# Patient Record
Sex: MALE | Race: OTHER | HISPANIC OR LATINO | Employment: UNEMPLOYED | URBAN - METROPOLITAN AREA
[De-identification: names, ages, dates, MRNs, and addresses within clinical notes are randomized per-mention and may not be internally consistent; named-entity substitution may affect disease eponyms.]

---

## 2018-02-12 ENCOUNTER — HOSPITAL ENCOUNTER (EMERGENCY)
Facility: HOSPITAL | Age: 23
Discharge: HOME | End: 2018-02-12
Attending: EMERGENCY MEDICINE | Admitting: EMERGENCY MEDICINE
Payer: COMMERCIAL

## 2018-02-12 VITALS
HEART RATE: 100 BPM | SYSTOLIC BLOOD PRESSURE: 147 MMHG | RESPIRATION RATE: 18 BRPM | WEIGHT: 142.8 LBS | DIASTOLIC BLOOD PRESSURE: 84 MMHG | TEMPERATURE: 98.6 F | OXYGEN SATURATION: 99 %

## 2018-02-12 DIAGNOSIS — N34.2 URETHRITIS: Primary | ICD-10-CM

## 2018-02-12 LAB
BACTERIA UR QL AUTO: ABNORMAL /HPF
BILIRUB UR QL STRIP: ABNORMAL
CLARITY UR: ABNORMAL
COLOR UR: YELLOW
GLUCOSE UR STRIP-MCNC: NEGATIVE MG/DL
HGB UR QL STRIP.AUTO: ABNORMAL
KETONES UR STRIP-MCNC: NEGATIVE MG/DL
LEUKOCYTE ESTERASE UR QL STRIP: ABNORMAL
NITRITE UR QL STRIP: NEGATIVE
NON-SQ EPI CELLS URNS QL MICRO: ABNORMAL /HPF
PH UR STRIP.AUTO: 6 [PH] (ref 4.5–8)
PROT UR STRIP-MCNC: ABNORMAL MG/DL
RBC #/AREA URNS AUTO: ABNORMAL /HPF
SP GR UR STRIP.AUTO: 1.02 (ref 1–1.03)
UROBILINOGEN UR QL STRIP.AUTO: 1 E.U./DL
WBC #/AREA URNS AUTO: ABNORMAL /HPF

## 2018-02-12 PROCEDURE — 87491 CHLMYD TRACH DNA AMP PROBE: CPT | Performed by: EMERGENCY MEDICINE

## 2018-02-12 PROCEDURE — 99283 EMERGENCY DEPT VISIT LOW MDM: CPT

## 2018-02-12 PROCEDURE — 87591 N.GONORRHOEAE DNA AMP PROB: CPT | Performed by: EMERGENCY MEDICINE

## 2018-02-12 PROCEDURE — 87086 URINE CULTURE/COLONY COUNT: CPT

## 2018-02-12 PROCEDURE — 81001 URINALYSIS AUTO W/SCOPE: CPT

## 2018-02-12 PROCEDURE — 81002 URINALYSIS NONAUTO W/O SCOPE: CPT | Performed by: EMERGENCY MEDICINE

## 2018-02-12 PROCEDURE — 96372 THER/PROPH/DIAG INJ SC/IM: CPT

## 2018-02-12 RX ORDER — IBUPROFEN 400 MG/1
400 TABLET ORAL ONCE
Status: COMPLETED | OUTPATIENT
Start: 2018-02-12 | End: 2018-02-12

## 2018-02-12 RX ORDER — AZITHROMYCIN 250 MG/1
1000 TABLET, FILM COATED ORAL ONCE
Status: COMPLETED | OUTPATIENT
Start: 2018-02-12 | End: 2018-02-12

## 2018-02-12 RX ADMIN — IBUPROFEN 400 MG: 400 TABLET, FILM COATED ORAL at 18:47

## 2018-02-12 RX ADMIN — WATER 250 MG: 1 INJECTION INTRAMUSCULAR; INTRAVENOUS; SUBCUTANEOUS at 18:48

## 2018-02-12 RX ADMIN — AZITHROMYCIN 1000 MG: 250 TABLET, FILM COATED ORAL at 18:47

## 2018-02-12 NOTE — DISCHARGE INSTRUCTIONS
We are treating you for gonorrhea and chlamydia, sexually transmitted infections  Please return emergency department have any change in symptoms worsening symptoms or any other concerns  Also included in your discharge paperwork is the [address], please call this number to get established with primary care  Chlamydia   WHAT YOU NEED TO KNOW:   Chlamydia is a sexually transmitted infection (STI)  It is caused by a bacteria most often spread through vaginal, oral, or anal sex  You have an increased risk of chlamydia if you have another STI, such as gonorrhea  Your risk is also higher if you have more than 1 sex partner  DISCHARGE INSTRUCTIONS:   Return to the emergency department if:   · You have a fever  · You have nausea or you cannot stop vomiting  · You have severe abdominal pain  Contact your healthcare provider if:   · Your signs or symptoms last longer than 1 week or get worse during treatment  · Your signs or symptoms return after treatment  · You have pain during sex  · You have questions or concerns about your condition or care  Medicines:   · Antibiotics  kill the bacteria that causes chlamydia  Take them as directed  · Take your medicine as directed  Contact your healthcare provider if you think your medicine is not helping or if you have side effects  Tell him or her if you are allergic to any medicine  Keep a list of the medicines, vitamins, and herbs you take  Include the amounts, and when and why you take them  Bring the list or the pill bottles to follow-up visits  Carry your medicine list with you in case of an emergency  Follow up with your healthcare provider as directed: You may need to return regularly for tests  Write down your questions so you remember to ask them during your visits  Prevent the spread of chlamydia:   · Wash your hands often  Use soap and water  Wash your hands after you use the bathroom   This helps prevent the infection from spreading to other parts of your body, such as your eyes  · Use a latex condom during sex to prevent chlamydia and other STIs  Use a new condom each time you have sex  · Talk to your sex partners  Tell anyone you have had sex with in the last 3 months that you have chlamydia  They may also be infected and need treatment  Ask your sex partners to get tested before you have sex  · Do not have sex until you and your partner have taken all of your antibiotics  Ask your healthcare provider when it is safe to have sex  · Get regular screenings for STIs  Ask your healthcare provider how often to get tested for STIs  He may tell you to get tested after you have sex with a new partner  Manage your symptoms:   · Keep your genital area clean and dry  Take showers instead of baths, and use unscented soap  · Do not douche unless your healthcare provider says it is okay  Do not use feminine hygiene sprays or powders  Tell your healthcare provider if you are pregnant:  You can spread chlamydia to your baby while you are pregnant  Your baby could get an eye infection or pneumonia  Chlamydia may also cause your baby to be born too early  Early treatment may prevent your baby from getting chlamydia  © 2017 [employer]  Information is for End User's use only and may not be sold, redistributed or otherwise used for commercial purposes  All illustrations and images included in CareNotes® are the copyrighted property of [employer]  or Declan Casillas  The above information is an  only  It is not intended as medical advice for individual conditions or treatments  Talk to your doctor, nurse or pharmacist before following any medical regimen to see if it is safe and effective for you

## 2018-02-12 NOTE — ED ATTENDING ATTESTATION
Caity Bliss DO, saw and evaluated the patient  I have discussed the patient with the resident/non-physician practitioner and agree with the resident's/non-physician practitioner's findings, Plan of Care, and MDM as documented in the resident's/non-physician practitioner's note, except where noted  All available labs and Radiology studies were reviewed  At this point I agree with the current assessment done in the Emergency Department  I have conducted an independent evaluation of this patient a history and physical is as follows:      Critical Care Time  CritCare Time    Procedures   57-year-old male with no past medical history presents to the emergency department with multiple complaints  His main complaint is that of diffuse body aches and decreased appetite  He states he has had a 10 pound weight loss in 2 weeks  Patient did privately confide in the mail ED resident stating that he has been having dysuria and penile discharge  ED resident reports purulent penile discharge  On my exam patient appears well and is in no acute distress  Pupils are equal and reactive to light  Extraocular muscles are intact  Neck is supple  TMs are normal   Mucous membranes are moist  No cervical lymphadenopathy  Heart is regular without murmur  Lungs are clear  Abdomen is soft and nontender  No hernia  No inguinal lymphadenopathy  Genital exam as per ED resident  Neurologically patient has no focal motor deficits  Skin is warm and dry, normal color no rash

## 2018-02-13 LAB — BACTERIA UR CULT: NORMAL

## 2018-02-13 NOTE — ED PROVIDER NOTES
History  Chief Complaint   Patient presents with    Generalized Body Aches     Pt reports generalized body pain x 2 days, "and I have a lot of headache and I drink ibuprofen, but it's not doing nothing", subjective fever yesterday, denies NV, diarrhea x1 today  HPI   57-year-old male comes in for evaluation body aches  Patient states that for week he has had body aches everywhere  However patient denies fevers chills sweats nausea vomiting or diarrhea  Asking the patient's partner to leave the room, I had another interview with the patient who does endorse painful urination with discharge  Patient does have sex with men  Last sexual activity was over a month ago  Patient intermittently uses condoms  Patient engages in both penetrative of and receptive anal sex  Patient has never had an HIV test     None       History reviewed  No pertinent past medical history  History reviewed  No pertinent surgical history  History reviewed  No pertinent family history  I have reviewed and agree with the history as documented  Social History   Substance Use Topics    Smoking status: Never Smoker    Smokeless tobacco: Never Used    Alcohol use No        Review of Systems   Constitutional: Negative  HENT: Negative  Eyes: Negative  Respiratory: Negative  Cardiovascular: Negative  Gastrointestinal: Negative  Endocrine: Negative  Genitourinary: Positive for discharge, dysuria and penile pain  Musculoskeletal: Negative  Skin: Negative  Allergic/Immunologic: Negative  Neurological: Negative  Hematological: Negative  Psychiatric/Behavioral: Negative          Physical Exam  ED Triage Vitals [02/12/18 1708]   Temperature Pulse Respirations Blood Pressure SpO2   98 6 °F (37 °C) (!) 115 18 155/87 100 %      Temp Source Heart Rate Source Patient Position - Orthostatic VS BP Location FiO2 (%)   Oral Monitor Sitting Right arm --      Pain Score       Worst Possible Pain Orthostatic Vital Signs  Vitals:    02/12/18 1708 02/12/18 1854   BP: 155/87 147/84   Pulse: (!) 115 100   Patient Position - Orthostatic VS: Sitting        Physical Exam   Constitutional: He is oriented to person, place, and time  He appears well-developed and well-nourished  No distress  HENT:   Head: Normocephalic and atraumatic  Right Ear: External ear normal    Left Ear: External ear normal    Mouth/Throat: Oropharynx is clear and moist    Eyes: Conjunctivae and EOM are normal  Pupils are equal, round, and reactive to light  Right eye exhibits no discharge  Left eye exhibits no discharge  No scleral icterus  Neck: Normal range of motion  Neck supple  No tracheal deviation present  No thyromegaly present  Cardiovascular: Normal rate, regular rhythm and intact distal pulses  Exam reveals no gallop and no friction rub  No murmur heard  Pulmonary/Chest: Effort normal and breath sounds normal  No stridor  No respiratory distress  He has no wheezes  He has no rales  Abdominal: Soft  Bowel sounds are normal  He exhibits no distension  There is no tenderness  There is no rebound and no guarding  Genitourinary:   Genitourinary Comments: Uncircumcised male with mucoid purulence coming from the urethral meatus  Musculoskeletal: Normal range of motion  He exhibits no edema or deformity  Neurological: He is alert and oriented to person, place, and time  No cranial nerve deficit  Skin: Skin is warm and dry  No rash noted  He is not diaphoretic  No erythema  Psychiatric: He has a normal mood and affect  His behavior is normal  Thought content normal    Nursing note and vitals reviewed        ED Medications  Medications   cefTRIAXone (ROCEPHIN) 250 mg in sterile water IM only syringe (250 mg Intramuscular Given 2/12/18 1848)   azithromycin (ZITHROMAX) tablet 1,000 mg (1,000 mg Oral Given 2/12/18 1847)   ibuprofen (MOTRIN) tablet 400 mg (400 mg Oral Given 2/12/18 1847)       Diagnostic Studies  Results Reviewed     Procedure Component Value Units Date/Time    Urine Microscopic [[id number]]  (Abnormal) Collected:  02/12/18 1856    Lab Status:  Final result Specimen:  Urine from Urine, Clean Catch Updated:  02/12/18 1958     RBC, UA 2-4 (A) /hpf      WBC, UA Innumerable (A) /hpf      Epithelial Cells Occasional /hpf      Bacteria, UA Occasional /hpf     Urine culture [[id number]] Collected:  02/12/18 1856    Lab Status: In process Specimen:  Urine from Urine, Clean Catch Updated:  02/12/18 1958    Chlamydia/GC amplified DNA by PCR [[id number]] Collected:  02/12/18 1900    Lab Status: In process Specimen:  Urine from Urine, Other Updated:  02/12/18 1908    POCT urinalysis dipstick [[id number]]  (Abnormal) Resulted:  02/12/18 1900    Lab Status:  Final result Specimen:  Urine Updated:  02/12/18 1900    ED Urine Macroscopic [[id number]]  (Abnormal) Collected:  02/12/18 1856    Lab Status:  Final result Specimen:  Urine Updated:  02/12/18 1856     Color, UA Yellow     Clarity, UA Cloudy     pH, UA 6 0     Leukocytes, UA Large (A)     Nitrite, UA Negative     Protein,  (2+) (A) mg/dl      Glucose, UA Negative mg/dl      Ketones, UA Negative mg/dl      Urobilinogen, UA 1 0 E U /dl      Bilirubin, UA Interference- unable to analyze (A)     Blood, UA Small (A)     Specific [place], UA 1 025    Narrative:       CLINITEK RESULT                 No orders to display         Procedures  Procedures      Phone Consults  ED Phone Contact    ED Course  ED Course                                MDM  Number of Diagnoses or Management Options  Urethritis:   Diagnosis management comments: 42-year-old male presents for evaluation of dysuria and urethral discharge  Will treat for GC and Chlamydia  Discussed patient following up with primary care for HIV test   Will provide patient with Milagros[facility] Chet Coburn link to follow up with primary care      CritCare Time    Disposition  Final diagnoses:   Urethritis     Time reflects when diagnosis was documented in both MDM as applicable and the Disposition within this note     Time User Action Codes Description Comment    2/12/2018  6:29 PM Silvia Quiros Add [N34 2] Urethritis       ED Disposition     ED Disposition Condition Comment    Discharge  Eveline Lowe discharge to home/self care  Condition at discharge: Stable        Follow-up Information     Follow up With Specialties Details Why Contact Info Additional [address] [facility] Emergency Department Emergency Medicine Go to As needed, If symptoms worsen [address] [facility]  [phone] [place] ED, [address] [facility] [address]  Call To get established with primary care  [phone]           There are no discharge medications for this patient  No discharge procedures on file  ED Provider  Attending physically available and evaluated Eveline Lowe I managed the patient along with the ED Attending      Electronically Signed by         Galileo Oconnor MD  02/13/18 8571

## 2018-02-17 NOTE — PROGRESS NOTES
Informed patient of +gonorrhea  Patient was treated in the ER  Instructed follow up with pcp  Patient agreeable

## 2018-02-22 LAB
CHLAMYDIA DNA CVX QL NAA+PROBE: ABNORMAL
N GONORRHOEA DNA GENITAL QL NAA+PROBE: ABNORMAL

## 2019-09-24 ENCOUNTER — HOSPITAL ENCOUNTER (EMERGENCY)
Facility: HOSPITAL | Age: 24
Discharge: HOME | End: 2019-09-24
Attending: EMERGENCY MEDICINE | Admitting: EMERGENCY MEDICINE

## 2019-09-24 VITALS
DIASTOLIC BLOOD PRESSURE: 80 MMHG | RESPIRATION RATE: 16 BRPM | SYSTOLIC BLOOD PRESSURE: 146 MMHG | HEART RATE: 83 BPM | WEIGHT: 154.76 LBS | TEMPERATURE: 98.2 F | OXYGEN SATURATION: 99 %

## 2019-09-24 DIAGNOSIS — M54.50 ACUTE LOW BACK PAIN: Primary | ICD-10-CM

## 2019-09-24 PROCEDURE — 99283 EMERGENCY DEPT VISIT LOW MDM: CPT | Performed by: PHYSICIAN ASSISTANT

## 2019-09-24 PROCEDURE — 99283 EMERGENCY DEPT VISIT LOW MDM: CPT

## 2019-09-24 RX ORDER — LIDOCAINE 50 MG/G
1 PATCH TOPICAL ONCE
Status: DISCONTINUED | OUTPATIENT
Start: 2019-09-24 | End: 2019-09-24 | Stop reason: HOSPADM

## 2019-09-24 RX ORDER — METHOCARBAMOL 500 MG/1
500 TABLET, FILM COATED ORAL 2 TIMES DAILY PRN
Qty: 20 TABLET | Refills: 0 | Status: SHIPPED | OUTPATIENT
Start: 2019-09-24 | End: 2020-07-21

## 2019-09-24 RX ORDER — NAPROXEN 500 MG/1
500 TABLET ORAL 2 TIMES DAILY PRN
Qty: 30 TABLET | Refills: 0 | Status: SHIPPED | OUTPATIENT
Start: 2019-09-24 | End: 2020-07-21

## 2019-09-24 RX ORDER — NAPROXEN 250 MG/1
500 TABLET ORAL ONCE
Status: COMPLETED | OUTPATIENT
Start: 2019-09-24 | End: 2019-09-24

## 2019-09-24 RX ORDER — METHOCARBAMOL 500 MG/1
500 TABLET, FILM COATED ORAL ONCE
Status: COMPLETED | OUTPATIENT
Start: 2019-09-24 | End: 2019-09-24

## 2019-09-24 RX ADMIN — LIDOCAINE 1 PATCH: 50 PATCH TOPICAL at 17:08

## 2019-09-24 RX ADMIN — METHOCARBAMOL TABLETS 500 MG: 500 TABLET, COATED ORAL at 17:08

## 2019-09-24 RX ADMIN — NAPROXEN 500 MG: 250 TABLET ORAL at 17:08

## 2019-09-24 NOTE — ED PROVIDER NOTES
History  Chief Complaint   Patient presents with    Back Pain     Patient reports lower back pain  Describing pain as spasms  started two days ago  no injury  denies loss of bowel or bladder function  Meryl Altamirano is a 21 yo M presenting with right lower back pain ongoing x3 days  Patient also describes sensation of spasm or musculature low back  Patient states he was lifting a heavy box prior to onset of pain  Denies numbness or tingling in the legs  Patient ambulatory initially and at this time  No loss of control of bowel or bladder function  No saddle anesthesia  No fevers/chills/IVDA  No medications prior to arrival for pain  History provided by:  Patient   used: No    Back Pain   Location:  Lumbar spine  Quality:  Aching and cramping  Radiates to:  Does not radiate  Pain severity:  No pain  Onset quality:  Sudden  Duration:  3 days  Timing:  Constant  Progression:  Waxing and waning  Chronicity:  New  Context: lifting heavy objects    Context: not falling    Worsened by:  Bending, twisting and touching  Ineffective treatments:  None tried  Associated symptoms: no abdominal pain, no bladder incontinence, no bowel incontinence, no chest pain, no fever, no headaches, no numbness and no weakness        None       History reviewed  No pertinent past medical history  History reviewed  No pertinent surgical history  History reviewed  No pertinent family history  I have reviewed and agree with the history as documented  Social History     Tobacco Use    Smoking status: Never Smoker    Smokeless tobacco: Never Used   Substance Use Topics    Alcohol use: No    Drug use: No        Review of Systems   Constitutional: Negative for activity change, chills and fever  HENT: Negative for congestion, rhinorrhea and sore throat  Eyes: Negative for photophobia and visual disturbance  Respiratory: Negative for cough, chest tightness, shortness of breath and wheezing  Cardiovascular: Negative for chest pain and palpitations  Gastrointestinal: Negative for abdominal pain, bowel incontinence, constipation, diarrhea, nausea and vomiting  Genitourinary: Negative for bladder incontinence, difficulty urinating, enuresis, flank pain, frequency, hematuria and urgency  Musculoskeletal: Positive for back pain  Negative for gait problem, neck pain and neck stiffness  Skin: Negative for rash and wound  Neurological: Negative for dizziness, tremors, speech difficulty, weakness, light-headedness, numbness and headaches  Physical Exam  Physical Exam   Constitutional: He is oriented to person, place, and time  He appears well-developed and well-nourished  No distress  HENT:   Head: Normocephalic and atraumatic  Right Ear: External ear normal    Left Ear: External ear normal    Nose: Nose normal    Mouth/Throat: Oropharynx is clear and moist  No oropharyngeal exudate  Eyes: Pupils are equal, round, and reactive to light  Conjunctivae and EOM are normal    Neck: Normal range of motion  Neck supple  Cardiovascular: Normal rate, regular rhythm, normal heart sounds and intact distal pulses  Exam reveals no gallop and no friction rub  No murmur heard  Pulmonary/Chest: Effort normal and breath sounds normal  No respiratory distress  He has no wheezes  He has no rales  Abdominal: Soft  He exhibits no distension and no mass  There is no tenderness  There is no guarding  Musculoskeletal: He exhibits tenderness  He exhibits no edema or deformity  Tenderness to palpation right lumbar paraspinal musculature  Limited range of motion lumbar spine in flexion and extension due to pain  Negative straight leg raise on the right side   Lymphadenopathy:     He has no cervical adenopathy  Neurological: He is alert and oriented to person, place, and time  He displays normal reflexes  No cranial nerve deficit or sensory deficit  He exhibits normal muscle tone   Coordination normal  Skin: Skin is warm and dry  Capillary refill takes less than 2 seconds  No rash noted  He is not diaphoretic  No erythema  No pallor  Psychiatric: He has a normal mood and affect  His behavior is normal  Judgment and thought content normal    Nursing note and vitals reviewed  Vital Signs  ED Triage Vitals [09/24/19 1644]   Temperature Pulse Respirations Blood Pressure SpO2   98 2 °F (36 8 °C) 83 16 146/80 99 %      Temp Source Heart Rate Source Patient Position - Orthostatic VS BP Location FiO2 (%)   Oral Monitor Sitting Right arm --      Pain Score       Worst Possible Pain           Vitals:    09/24/19 1644   BP: 146/80   Pulse: 83   Patient Position - Orthostatic VS: Sitting         Visual Acuity      ED Medications  Medications   lidocaine (LIDODERM) 5 % patch 1 patch (1 patch Topical Medication Applied 9/24/19 1708)   naproxen (NAPROSYN) tablet 500 mg (500 mg Oral Given 9/24/19 1708)   methocarbamol (ROBAXIN) tablet 500 mg (500 mg Oral Given 9/24/19 1708)       Diagnostic Studies  Results Reviewed     None                 No orders to display              Procedures  Procedures       ED Course  ED Course as of Sep 24 1758   Tue Sep 24, 2019   1756 Patient reports significant improvement in low back pain following ED therapy  MDM  Number of Diagnoses or Management Options  Acute low back pain:   Diagnosis management comments: Right lower back pain, muscular tenderness to palpation with spasm noted  Significant improvement pain after ED therapy  Advised follow-up with orthopedics as needed      Patient Progress  Patient progress: improved      Disposition  Final diagnoses:   Acute low back pain     Time reflects when diagnosis was documented in both MDM as applicable and the Disposition within this note     Time User Action Codes Description Comment    9/24/2019  5:56 PM Sivan Alegre Add [M54 5] Acute low back pain       ED Disposition     ED Disposition Condition Date/Time Comment    Discharge Stable [place] Sep 24, 2019  5:56 PM Dangelo Corbett discharge to home/self care  Follow-up Information     Follow up With Specialties Details Why Contact Info Additional Information    [address] [facility]  As needed [address]  [place] [address] [facility] [address]  [address] [facility], [address],  [address] [facility] [place], [address]          Patient's Medications   Discharge Prescriptions    METHOCARBAMOL (ROBAXIN) 500 MG TABLET    Take 1 tablet (500 mg total) by mouth 2 (two) times a day as needed for muscle spasms       Start Date: 9/24/2019 End Date: --       Order Dose: 500 mg       Quantity: 20 tablet    Refills: 0    NAPROXEN (NAPROSYN) 500 MG TABLET    Take 1 tablet (500 mg total) by mouth 2 (two) times a day as needed for mild pain or moderate pain       Start Date: 9/24/2019 End Date: --       Order Dose: 500 mg       Quantity: 30 tablet    Refills: 0     No discharge procedures on file      ED Provider  Electronically Signed by           Thomas Avalos PA-C  09/24/19 4440

## 2020-07-21 ENCOUNTER — HOSPITAL ENCOUNTER (EMERGENCY)
Facility: HOSPITAL | Age: 25
Discharge: LEFT BEFORE DISCHARGE | End: 2020-07-21
Attending: EMERGENCY MEDICINE | Admitting: EMERGENCY MEDICINE
Payer: OTHER GOVERNMENT

## 2020-07-21 VITALS
WEIGHT: 152.12 LBS | TEMPERATURE: 98 F | HEART RATE: 77 BPM | DIASTOLIC BLOOD PRESSURE: 85 MMHG | SYSTOLIC BLOOD PRESSURE: 151 MMHG | RESPIRATION RATE: 16 BRPM | OXYGEN SATURATION: 99 %

## 2020-07-21 DIAGNOSIS — R63.0 LOSS OF APPETITE: Primary | ICD-10-CM

## 2020-07-21 LAB — SARS-COV-2 RNA RESP QL NAA+PROBE: NEGATIVE

## 2020-07-21 PROCEDURE — 99284 EMERGENCY DEPT VISIT MOD MDM: CPT

## 2020-07-21 PROCEDURE — 99282 EMERGENCY DEPT VISIT SF MDM: CPT | Performed by: EMERGENCY MEDICINE

## 2020-07-21 PROCEDURE — 87635 SARS-COV-2 COVID-19 AMP PRB: CPT | Performed by: PHYSICIAN ASSISTANT

## 2020-07-21 NOTE — ED PROVIDER NOTES
History  Chief Complaint   Patient presents with    Loss of Appetite     pt c/o loss of taste and loss of appetite that started this morning; pt denies fevers, cough  pt denies cp/sob/n/v/d     Kate Black is a 31 yo M presenting with loss of taste and smell, and decreased appetite since this morning  Reports multiple known sick contacts at work with confirmed COVID-23  Reports he is otherwise asymptomatic  Denies cough, fevers, chills, myalgias  Denies recent travel  History provided by:  Patient   used: No    Medical Problem   Quality:  Loss of taste/smell  Onset quality:  Sudden  Duration:  1 day  Timing:  Constant  Chronicity:  New  Associated symptoms: no abdominal pain, no chest pain, no congestion, no cough, no ear pain, no fever, no nausea, no rash, no rhinorrhea, no shortness of breath, no sore throat, no vomiting and no wheezing        None       History reviewed  No pertinent past medical history  Past Surgical History:   Procedure Laterality Date    NO PAST SURGERIES         History reviewed  No pertinent family history  I have reviewed and agree with the history as documented  E-Cigarette/Vaping    E-Cigarette Use Never User      E-Cigarette/Vaping Substances     Social History     Tobacco Use    Smoking status: Current Every Day Smoker     Packs/day: 0 25    Smokeless tobacco: Never Used   Substance Use Topics    Alcohol use: No    Drug use: No       Review of Systems   Constitutional: Negative for chills and fever  HENT: Negative for congestion, dental problem, ear pain, rhinorrhea, sore throat, trouble swallowing and voice change  Eyes: Negative for pain and visual disturbance  Respiratory: Negative for cough, shortness of breath and wheezing  Cardiovascular: Negative for chest pain and palpitations  Gastrointestinal: Negative for abdominal pain, nausea and vomiting  Genitourinary: Negative for dysuria, frequency and urgency     Musculoskeletal: Negative for back pain, neck pain and neck stiffness  Skin: Negative for rash and wound  Neurological: Negative for dizziness, weakness, light-headedness and numbness  Physical Exam  Physical Exam   Constitutional: He is oriented to person, place, and time  He appears well-developed and well-nourished  No distress  HENT:   Head: Normocephalic and atraumatic  Right Ear: External ear normal    Left Ear: External ear normal    Mouth/Throat: Oropharynx is clear and moist    Eyes: Pupils are equal, round, and reactive to light  Conjunctivae and EOM are normal    Neck: Normal range of motion  Neck supple  Cardiovascular: Normal rate, regular rhythm, normal heart sounds and intact distal pulses  Exam reveals no gallop and no friction rub  No murmur heard  Pulmonary/Chest: Effort normal and breath sounds normal  No respiratory distress  He has no wheezes  Abdominal: Soft  He exhibits no distension  There is no tenderness  Lymphadenopathy:     He has no cervical adenopathy  Neurological: He is alert and oriented to person, place, and time  He exhibits normal muscle tone  Coordination normal    Skin: Skin is warm and dry  Capillary refill takes less than 2 seconds  No rash noted  No erythema  Psychiatric: He has a normal mood and affect   His behavior is normal  Judgment and thought content normal        Vital Signs  ED Triage Vitals [07/21/20 0919]   Temperature Pulse Respirations Blood Pressure SpO2   98 °F (36 7 °C) 77 16 151/85 99 %      Temp Source Heart Rate Source Patient Position - Orthostatic VS BP Location FiO2 (%)   Temporal Monitor Sitting Right arm --      Pain Score       --           Vitals:    07/21/20 0919   BP: 151/85   Pulse: 77   Patient Position - Orthostatic VS: Sitting         Visual Acuity      ED Medications  Medications - No data to display    Diagnostic Studies  Results Reviewed     Procedure Component Value Units Date/Time    Novel Coronavirus (Covid-19),PCR [facility] [[id number]]  (Normal) Collected:  07/21/20 1006    Lab Status:  Final result Specimen:  Nares from Nose Updated:  07/21/20 1105     SARS-CoV-2 Negative    Narrative:       Reza Skelton in the ER                 No orders to display              Procedures  Procedures         ED Course                                             MDM  Number of Diagnoses or Management Options  Loss of appetite:   Diagnosis management comments: Loss of taste/smell, appetite since this morning  Pt otherwise asymptomatic  COVID-19 testing obtained as pt has close COVID-19 positive contacts  At home quarantine instructions/return information provided  Strict return indications discussed  Disposition  Final diagnoses:   Loss of appetite     Time reflects when diagnosis was documented in both MDM as applicable and the Disposition within this note     Time User Action Codes Description Comment    7/21/2020 10:21 AM Tony Burroughs Add [R63 0] Loss of appetite       ED Disposition     ED Disposition Condition Date/Time Comment    Aria Anneamparo Jul 21, 2020 10:05 AM       Follow-up Information     Follow up With Specialties Details Why Contact Info Additional [address] [facility] Emergency Department Emergency Medicine  If symptoms worsen [facility] [address] [phone] [place] ED, [address]  , [facility], [address] [facility], [address]          There are no discharge medications for this patient  No discharge procedures on file      PDMP Review     None          ED Provider  Electronically Signed by           Chayo Rodney PA-C  07/21/20 7943

## 2020-07-21 NOTE — ED NOTES
Pt swabbed by provider and left department without waiting for discharge paperwork        Suha Jimenez RN  07/21/20 9480

## 2020-12-12 ENCOUNTER — OCCMED (OUTPATIENT)
Dept: URGENT CARE | Facility: MEDICAL CENTER | Age: 25
End: 2020-12-12
Payer: OTHER MISCELLANEOUS

## 2020-12-12 DIAGNOSIS — Z53.21 PROC/TRTMT NOT CRD OUT D/T PT LV BEF SEEN BY HLTH CARE PROV: Primary | ICD-10-CM

## 2020-12-12 PROCEDURE — 99283 EMERGENCY DEPT VISIT LOW MDM: CPT | Performed by: FAMILY MEDICINE

## 2020-12-12 PROCEDURE — G0382 LEV 3 HOSP TYPE B ED VISIT: HCPCS | Performed by: FAMILY MEDICINE

## 2021-05-13 ENCOUNTER — HOSPITAL ENCOUNTER (EMERGENCY)
Facility: HOSPITAL | Age: 26
Discharge: HOME | End: 2021-05-13
Attending: EMERGENCY MEDICINE | Admitting: EMERGENCY MEDICINE

## 2021-05-13 VITALS
HEART RATE: 81 BPM | OXYGEN SATURATION: 100 % | SYSTOLIC BLOOD PRESSURE: 120 MMHG | RESPIRATION RATE: 18 BRPM | DIASTOLIC BLOOD PRESSURE: 78 MMHG | TEMPERATURE: 97.6 F

## 2021-05-13 DIAGNOSIS — Z20.2 STD EXPOSURE: Primary | ICD-10-CM

## 2021-05-13 PROCEDURE — 99283 EMERGENCY DEPT VISIT LOW MDM: CPT

## 2021-05-13 PROCEDURE — 36415 COLL VENOUS BLD VENIPUNCTURE: CPT | Performed by: PHYSICIAN ASSISTANT

## 2021-05-13 PROCEDURE — 99282 EMERGENCY DEPT VISIT SF MDM: CPT | Performed by: PHYSICIAN ASSISTANT

## 2021-05-13 PROCEDURE — 87389 HIV-1 AG W/HIV-1&-2 AB AG IA: CPT | Performed by: PHYSICIAN ASSISTANT

## 2021-05-13 NOTE — ED NOTES
Pt states that he has to leave and unable to provide urine sample  States main concern was the HIV test and needs to go         Liane Swanson RN  05/13/21 1959

## 2021-05-13 NOTE — ED PROVIDER NOTES
History  Chief Complaint   Patient presents with    Exposure to STD     Pt reports his partner of a few months just informed him that he has HIV and patient would like to be tested  Denies any symptoms  24-year-old male with no significant past medical history presents emergency department for evaluation exposure to STD  Patient reports his partner several months ago when he had unprotected intercourse approximately twice just informed him that the partner tested positive for HIV  Patient denies any fevers, body aches, abdominal pain, nausea, vomiting, diarrhea, penile pain, penile discharge, testicular pain or swelling  History provided by:  Medical records and patient   used: No        None       History reviewed  No pertinent past medical history  Past Surgical History:   Procedure Laterality Date    NO PAST SURGERIES         History reviewed  No pertinent family history  I have reviewed and agree with the history as documented  E-Cigarette/Vaping    E-Cigarette Use Never User      E-Cigarette/Vaping Substances     Social History     Tobacco Use    Smoking status: Current Every Day Smoker     Packs/day: 0 25    Smokeless tobacco: Never Used   Substance Use Topics    Alcohol use: No    Drug use: No       Review of Systems   Constitutional: Negative for chills, fever and unexpected weight change  Genitourinary: Negative for decreased urine volume, discharge, flank pain, genital sores, hematuria, penile pain, penile swelling and testicular pain  Skin: Negative for rash and wound  All other systems reviewed and are negative  Physical Exam  Physical Exam  Vitals signs and nursing note reviewed  Constitutional:       General: He is not in acute distress  Appearance: He is well-developed  He is not ill-appearing or toxic-appearing  HENT:      Head: Normocephalic and atraumatic        Right Ear: Hearing and external ear normal       Left Ear: Hearing and external ear normal    Eyes:      Conjunctiva/sclera: Conjunctivae normal    Neck:      Vascular: No JVD  Trachea: No tracheal deviation  Cardiovascular:      Rate and Rhythm: Normal rate and regular rhythm  Heart sounds: Normal heart sounds, S1 normal and S2 normal  No murmur  Pulmonary:      Effort: Pulmonary effort is normal       Breath sounds: Normal breath sounds  No decreased breath sounds, wheezing, rhonchi or rales  Genitourinary:     Comments: Deferred  Musculoskeletal:      Comments: Moves all four limbs without difficulty, crepitus, swelling, or deformity  Skin:     General: Skin is warm and dry  Findings: No rash  Neurological:      Mental Status: He is alert and oriented to person, place, and time  GCS: GCS eye subscore is 4  GCS verbal subscore is 5  GCS motor subscore is 6  Psychiatric:         Mood and Affect: Mood normal          Speech: Speech normal          Vital Signs  ED Triage Vitals [05/13/21 0728]   Temperature Pulse Respirations Blood Pressure SpO2   97 6 °F (36 4 °C) 81 18 120/78 100 %      Temp Source Heart Rate Source Patient Position - Orthostatic VS BP Location FiO2 (%)   Oral Monitor Sitting Right arm --      Pain Score       No Pain           Vitals:    05/13/21 0728   BP: 120/78   Pulse: 81   Patient Position - Orthostatic VS: Sitting         Visual Acuity      ED Medications  Medications - No data to display    Diagnostic Studies  Results Reviewed     Procedure Component Value Units Date/Time    HIV 1/2 [id number] Collected: 05/13/21 5808    Lab Status:  In process Specimen: Blood from Arm, Right Updated: 05/13/21 0835    Chlamydia/GC amplified DNA by PCR [[id number]]     Lab Status: No result                  No orders to display              Procedures  Procedures         ED Course                             SBIRT 20yo+      Most Recent Value   SBIRT (22 yo +)   In order to provide better care to our patients, we are screening all of our patients for alcohol and drug use  Would it be okay to ask you these screening questions? Yes Filed at: 05/13/2021 0817   Initial Alcohol Screen: US AUDIT-C    1  How often do you have a drink containing alcohol?  0 Filed at: 05/13/2021 0817   2  How many drinks containing alcohol do you have on a typical day you are drinking? 0 Filed at: 05/13/2021 0817   3a  Male UNDER 65: How often do you have five or more drinks on one occasion? 0 Filed at: 05/13/2021 0817   Audit-C Score  0 Filed at: 05/13/2021 9658   RONNIE: How many times in the past year have you    Used an illegal drug or used a prescription medication for non-medical reasons? Never Filed at: 05/13/2021 0817                    MDM  Number of Diagnoses or Management Options  STD exposure:   Diagnosis management comments: 57-year-old male presents for concern for STD exposure  Patient's partner recently informed him of HIV status  Will send for HIV testing, gonorrhea/chlamydia  Counseled patient that should the HIV positive, he will need to establish care with an HIV clinic and a infectious disease specialist    & [facility] as well as Infectious Disease contact information works prior to the patient  The management plan was discussed in detail with the patient at bedside and all questions were answered  The prior to discharge, we provided both verbal and written instructions  We discussed with the patient the signs and symptoms for which to return to the emergency department  All questions were answered and patient was comfortable with the plan of care and discharged to home  Instructed the patient to follow up with the primary care provider and/or special as provided and their written instructions  The patient verbalized understanding of our discussion and plan of care, and agrees to return to the Emergency Department for concerns and progression of illness           Amount and/or Complexity of Data Reviewed  Clinical lab tests: ordered        Disposition  Final diagnoses:   STD exposure     Time reflects when diagnosis was documented in both MDM as applicable and the Disposition within this note     Time User Action Codes Description Comment    5/13/2021  8:21 AM [address] N[address] Marino [Z20 2] STD exposure       ED Disposition     ED Disposition Condition Date/Time Comment    Discharge Stable Thu May 13, 2021  8:21 AM [place] Slight discharge to home/self care  Follow-up Information     Follow up With Specialties Details Why Contact Info Additional Information    [facility]   follow up if testing is positive  [address] [facility]  [address] [address] [facility], [address] [place] [address] [facility]  follow up if testing is positive  [address] [facility] [address]  [address] [place] [address] [address], [address], [phone]          There are no discharge medications for this patient  No discharge procedures on file      PDMP Review     None          ED Provider  Electronically Signed by           Juan Jose Choe PA-C  05/13/21 8191

## 2021-05-14 LAB — HIV 1+2 AB+HIV1 P24 AG SERPL QL IA: NORMAL

## 2024-10-10 ENCOUNTER — HOSPITAL ENCOUNTER (INPATIENT)
Facility: HOSPITAL | Age: 29
LOS: 2 days | Discharge: HOME | DRG: 047 | End: 2024-10-13
Attending: EMERGENCY MEDICINE | Admitting: INTERNAL MEDICINE
Payer: COMMERCIAL

## 2024-10-10 ENCOUNTER — APPOINTMENT (EMERGENCY)
Dept: CT IMAGING | Facility: HOSPITAL | Age: 29
DRG: 047 | End: 2024-10-10
Payer: COMMERCIAL

## 2024-10-10 DIAGNOSIS — R29.810 FACIAL DROOP: Primary | ICD-10-CM

## 2024-10-10 DIAGNOSIS — G45.9 TIA (TRANSIENT ISCHEMIC ATTACK): ICD-10-CM

## 2024-10-10 DIAGNOSIS — R20.0 LEFT FACIAL NUMBNESS: ICD-10-CM

## 2024-10-10 PROBLEM — R29.90 STROKE-LIKE SYMPTOMS: Status: ACTIVE | Noted: 2024-10-10

## 2024-10-10 PROBLEM — E87.6 HYPOKALEMIA: Status: ACTIVE | Noted: 2024-10-10

## 2024-10-10 LAB
ALBUMIN SERPL BCG-MCNC: 4.9 G/DL (ref 3.5–5)
ALP SERPL-CCNC: 71 U/L (ref 34–104)
ALT SERPL W P-5'-P-CCNC: 41 U/L (ref 7–52)
ANION GAP SERPL CALCULATED.3IONS-SCNC: 6 MMOL/L (ref 4–13)
APTT PPP: 27 SECONDS (ref 23–34)
AST SERPL W P-5'-P-CCNC: 25 U/L (ref 13–39)
BASOPHILS # BLD AUTO: 0.05 THOUSANDS/ΜL (ref 0–0.1)
BASOPHILS NFR BLD AUTO: 1 % (ref 0–1)
BILIRUB SERPL-MCNC: 0.89 MG/DL (ref 0.2–1)
BUN SERPL-MCNC: 12 MG/DL (ref 5–25)
CALCIUM SERPL-MCNC: 9.7 MG/DL (ref 8.4–10.2)
CARDIAC TROPONIN I PNL SERPL HS: 3 NG/L
CHLORIDE SERPL-SCNC: 105 MMOL/L (ref 96–108)
CO2 SERPL-SCNC: 29 MMOL/L (ref 21–32)
CREAT SERPL-MCNC: 0.98 MG/DL (ref 0.6–1.3)
EOSINOPHIL # BLD AUTO: 0.22 THOUSAND/ΜL (ref 0–0.61)
EOSINOPHIL NFR BLD AUTO: 3 % (ref 0–6)
ERYTHROCYTE [DISTWIDTH] IN BLOOD BY AUTOMATED COUNT: 12.1 % (ref 11.6–15.1)
GFR SERPL CREATININE-BSD FRML MDRD: 103 ML/MIN/1.73SQ M
GLUCOSE SERPL-MCNC: 61 MG/DL (ref 65–140)
GLUCOSE SERPL-MCNC: 99 MG/DL (ref 65–140)
HCT VFR BLD AUTO: 45.7 % (ref 36.5–49.3)
HGB BLD-MCNC: 16 G/DL (ref 12–17)
IMM GRANULOCYTES # BLD AUTO: 0.02 THOUSAND/UL (ref 0–0.2)
IMM GRANULOCYTES NFR BLD AUTO: 0 % (ref 0–2)
INR PPP: 1.08 (ref 0.85–1.19)
LYMPHOCYTES # BLD AUTO: 2.15 THOUSANDS/ΜL (ref 0.6–4.47)
LYMPHOCYTES NFR BLD AUTO: 32 % (ref 14–44)
MCH RBC QN AUTO: 30.7 PG (ref 26.8–34.3)
MCHC RBC AUTO-ENTMCNC: 35 G/DL (ref 31.4–37.4)
MCV RBC AUTO: 88 FL (ref 82–98)
MONOCYTES # BLD AUTO: 0.71 THOUSAND/ΜL (ref 0.17–1.22)
MONOCYTES NFR BLD AUTO: 11 % (ref 4–12)
NEUTROPHILS # BLD AUTO: 3.57 THOUSANDS/ΜL (ref 1.85–7.62)
NEUTS SEG NFR BLD AUTO: 53 % (ref 43–75)
NRBC BLD AUTO-RTO: 0 /100 WBCS
PLATELET # BLD AUTO: 205 THOUSANDS/UL (ref 149–390)
PMV BLD AUTO: 10 FL (ref 8.9–12.7)
POTASSIUM SERPL-SCNC: 3.2 MMOL/L (ref 3.5–5.3)
PROT SERPL-MCNC: 7.7 G/DL (ref 6.4–8.4)
PROTHROMBIN TIME: 14.2 SECONDS (ref 12.3–15)
RBC # BLD AUTO: 5.21 MILLION/UL (ref 3.88–5.62)
SODIUM SERPL-SCNC: 140 MMOL/L (ref 135–147)
WBC # BLD AUTO: 6.72 THOUSAND/UL (ref 4.31–10.16)

## 2024-10-10 PROCEDURE — NC001 PR NO CHARGE: Performed by: EMERGENCY MEDICINE

## 2024-10-10 PROCEDURE — 36415 COLL VENOUS BLD VENIPUNCTURE: CPT

## 2024-10-10 PROCEDURE — 85610 PROTHROMBIN TIME: CPT

## 2024-10-10 PROCEDURE — 82948 REAGENT STRIP/BLOOD GLUCOSE: CPT

## 2024-10-10 PROCEDURE — 70498 CT ANGIOGRAPHY NECK: CPT

## 2024-10-10 PROCEDURE — 70496 CT ANGIOGRAPHY HEAD: CPT

## 2024-10-10 PROCEDURE — 99285 EMERGENCY DEPT VISIT HI MDM: CPT

## 2024-10-10 PROCEDURE — 99222 1ST HOSP IP/OBS MODERATE 55: CPT | Performed by: NURSE PRACTITIONER

## 2024-10-10 PROCEDURE — 85730 THROMBOPLASTIN TIME PARTIAL: CPT

## 2024-10-10 PROCEDURE — 93005 ELECTROCARDIOGRAM TRACING: CPT

## 2024-10-10 PROCEDURE — 84484 ASSAY OF TROPONIN QUANT: CPT

## 2024-10-10 PROCEDURE — 85025 COMPLETE CBC W/AUTO DIFF WBC: CPT

## 2024-10-10 PROCEDURE — 86618 LYME DISEASE ANTIBODY: CPT

## 2024-10-10 PROCEDURE — 99285 EMERGENCY DEPT VISIT HI MDM: CPT | Performed by: EMERGENCY MEDICINE

## 2024-10-10 PROCEDURE — 80053 COMPREHEN METABOLIC PANEL: CPT

## 2024-10-10 RX ORDER — ACETAMINOPHEN 325 MG/1
975 TABLET ORAL EVERY 6 HOURS PRN
Status: DISCONTINUED | OUTPATIENT
Start: 2024-10-10 | End: 2024-10-13 | Stop reason: HOSPADM

## 2024-10-10 RX ORDER — ONDANSETRON 2 MG/ML
4 INJECTION INTRAMUSCULAR; INTRAVENOUS EVERY 6 HOURS PRN
Status: DISCONTINUED | OUTPATIENT
Start: 2024-10-10 | End: 2024-10-13 | Stop reason: HOSPADM

## 2024-10-10 RX ORDER — MAGNESIUM HYDROXIDE/ALUMINUM HYDROXICE/SIMETHICONE 120; 1200; 1200 MG/30ML; MG/30ML; MG/30ML
30 SUSPENSION ORAL EVERY 6 HOURS PRN
Status: DISCONTINUED | OUTPATIENT
Start: 2024-10-10 | End: 2024-10-13 | Stop reason: HOSPADM

## 2024-10-10 RX ORDER — ASPIRIN 81 MG/1
324 TABLET, CHEWABLE ORAL ONCE
Status: COMPLETED | OUTPATIENT
Start: 2024-10-10 | End: 2024-10-10

## 2024-10-10 RX ORDER — POTASSIUM CHLORIDE 1500 MG/1
40 TABLET, EXTENDED RELEASE ORAL ONCE
Status: COMPLETED | OUTPATIENT
Start: 2024-10-10 | End: 2024-10-10

## 2024-10-10 RX ORDER — ASPIRIN 81 MG/1
81 TABLET, CHEWABLE ORAL DAILY
Status: DISCONTINUED | OUTPATIENT
Start: 2024-10-11 | End: 2024-10-13 | Stop reason: HOSPADM

## 2024-10-10 RX ADMIN — IOHEXOL 85 ML: 350 INJECTION, SOLUTION INTRAVENOUS at 19:48

## 2024-10-10 RX ADMIN — ASPIRIN 81 MG CHEWABLE TABLET 324 MG: 81 TABLET CHEWABLE at 20:10

## 2024-10-10 RX ADMIN — POTASSIUM CHLORIDE 40 MEQ: 1500 TABLET, EXTENDED RELEASE ORAL at 22:32

## 2024-10-10 NOTE — QUICK NOTE
Stroke alert called at 7:38PM  Neurology response at 7:38PM    Brief HPI: 28M with no PMH presented with acute onset of facial palsy and left sided facial sensory change. BP elevated on presentation to 170/104.    Last known well: 5:40PM  NIHSS 2 - right facial weakness, and left facial numbness    CTH - no acute findings  CTA - no LVO    IV thrombolysis was not given due to low NIHSS with non-disabling symptoms  - Recommend loading aspirin 325 mg times once, 81 mg daily  - Goal map greater than 100, SBP less than 210  - Admit to stroke pathway    I discussed this case with the managing team from a remote location. I did not personally see or examine this patient. I have provided limited recommendations as above, but ultimate patient disposition as per managing team.

## 2024-10-10 NOTE — LETTER
[place] [address]  [address] [facility] [address]  Dept: [phone]    October 13, 2024     Patient: Maurisio Ramirez   YOB: 1995   Date of Visit: 10/10/2024       To Whom it May Concern:    Maurisio Ramirez is under my professional care. He was seen in the hospital from 10/10/2024 to 10/13/24. He may return to work on 10/14/24 without limitations.    If you have any questions or concerns, please don't hesitate to call.         Sincerely,          Jana Valencia MD

## 2024-10-10 NOTE — ED PROVIDER NOTES
I supervised the Advanced Practitioner.? I performed, in its entirety, the assessment and plan component of the visit.  I agree with the Advanced Practitioner's note with the following assessment and plan:      Patient with sudden onset of left-sided facial numbness and facial asymmetry around 5 30-5 40 5 PM.  He states he was sleeping when he woke up with sudden onset symptoms.  He has had no headache, facial injury, head injury, fevers, chills or URI symptoms.  He has no ear pain, changes in vision.  He had no scotomas or preceding symptoms prior to the sudden onset.  On exam he is noted to be hypertensive.  Patient denies any past medical history.    Patient resting in bed in no distress.  Patient maintaining airway and secretions.  Patient does have tongue deviation to the left.  No posterior pharyngeal erythema or exudate.  No brawniness under the tongue.  EOMI.  PERRLA.  Patient is unable to wrinkle the left side of his forehead, able to wrinkle right forehead.  He is unable to close his left eye completely; however able to close right eye completely.  He has decreased sensation versus touch deep pressure on the left side cranial nerve V,( I, II and III).  Negative pronator drift.  Patient is full active range of motion of bilateral upper extremities and lower extremity spontaneously which other independently and pain-free.  No truncal ataxia.  Regular rate and rhythm.  Lungs clear to auscultation bilaterally      Stroke alert was initiated.  Neurology was consulted.  And agreed with workup.  Patient will be loaded with aspirin and plan for admission.    Glucose is 61 with potassium 3.2.  TT angio head and neck with no acute findings. EKG normal sinus with without ischemia    Kayla Coley PA-C to discuss with admission and keep for observation for further workup.  Patient remains hemodynamically stable      Danielle Olvera, DO 10/10/24        Danielle Olvera, DO  10/10/24 2038

## 2024-10-10 NOTE — Clinical Note
Case was discussed with MARIELOS and the patient's admission status was agreed to be Admission Status: inpatient status to the service of Dr. Padgett

## 2024-10-10 NOTE — Clinical Note
Maurisio Ramirez was seen and treated in our emergency department on 10/10/2024.                Diagnosis:     Maurisio  may return to work on return date.    He may return on this date:          If you have any questions or concerns, please don't hesitate to call.      Ruchi Chavis RN    ______________________________           _______________          _______________  Hospital Representative                              Date                                Time

## 2024-10-11 ENCOUNTER — APPOINTMENT (OUTPATIENT)
Dept: MRI IMAGING | Facility: HOSPITAL | Age: 29
DRG: 047 | End: 2024-10-11
Payer: COMMERCIAL

## 2024-10-11 ENCOUNTER — APPOINTMENT (OUTPATIENT)
Dept: NON INVASIVE DIAGNOSTICS | Facility: HOSPITAL | Age: 29
DRG: 047 | End: 2024-10-11
Payer: COMMERCIAL

## 2024-10-11 PROBLEM — G45.9 TIA (TRANSIENT ISCHEMIC ATTACK): Status: ACTIVE | Noted: 2024-10-10

## 2024-10-11 PROBLEM — E78.5 HYPERLIPIDEMIA: Status: ACTIVE | Noted: 2024-10-11

## 2024-10-11 LAB
ANION GAP SERPL CALCULATED.3IONS-SCNC: 6 MMOL/L (ref 4–13)
ATRIAL RATE: 85 BPM
B BURGDOR IGG+IGM SER QL IA: NEGATIVE
BASOPHILS # BLD AUTO: 0.02 THOUSANDS/ΜL (ref 0–0.1)
BASOPHILS NFR BLD AUTO: 0 % (ref 0–1)
BUN SERPL-MCNC: 10 MG/DL (ref 5–25)
CALCIUM SERPL-MCNC: 9 MG/DL (ref 8.4–10.2)
CHLORIDE SERPL-SCNC: 108 MMOL/L (ref 96–108)
CHOLEST SERPL-MCNC: 168 MG/DL
CO2 SERPL-SCNC: 27 MMOL/L (ref 21–32)
CREAT SERPL-MCNC: 0.94 MG/DL (ref 0.6–1.3)
EOSINOPHIL # BLD AUTO: 0.23 THOUSAND/ΜL (ref 0–0.61)
EOSINOPHIL NFR BLD AUTO: 4 % (ref 0–6)
ERYTHROCYTE [DISTWIDTH] IN BLOOD BY AUTOMATED COUNT: 12.1 % (ref 11.6–15.1)
EST. AVERAGE GLUCOSE BLD GHB EST-MCNC: 103 MG/DL
GFR SERPL CREATININE-BSD FRML MDRD: 108 ML/MIN/1.73SQ M
GLUCOSE P FAST SERPL-MCNC: 91 MG/DL (ref 65–99)
GLUCOSE SERPL-MCNC: 91 MG/DL (ref 65–140)
HBA1C MFR BLD: 5.2 %
HCT VFR BLD AUTO: 41.6 % (ref 36.5–49.3)
HDLC SERPL-MCNC: 31 MG/DL
HGB BLD-MCNC: 14.6 G/DL (ref 12–17)
IMM GRANULOCYTES # BLD AUTO: 0.02 THOUSAND/UL (ref 0–0.2)
IMM GRANULOCYTES NFR BLD AUTO: 0 % (ref 0–2)
LDLC SERPL CALC-MCNC: 108 MG/DL (ref 0–100)
LYMPHOCYTES # BLD AUTO: 1.77 THOUSANDS/ΜL (ref 0.6–4.47)
LYMPHOCYTES NFR BLD AUTO: 30 % (ref 14–44)
MCH RBC QN AUTO: 30 PG (ref 26.8–34.3)
MCHC RBC AUTO-ENTMCNC: 35.1 G/DL (ref 31.4–37.4)
MCV RBC AUTO: 85 FL (ref 82–98)
MONOCYTES # BLD AUTO: 0.7 THOUSAND/ΜL (ref 0.17–1.22)
MONOCYTES NFR BLD AUTO: 12 % (ref 4–12)
NEUTROPHILS # BLD AUTO: 3.14 THOUSANDS/ΜL (ref 1.85–7.62)
NEUTS SEG NFR BLD AUTO: 54 % (ref 43–75)
NRBC BLD AUTO-RTO: 0 /100 WBCS
P AXIS: 47 DEGREES
PLATELET # BLD AUTO: 188 THOUSANDS/UL (ref 149–390)
PMV BLD AUTO: 10.2 FL (ref 8.9–12.7)
POTASSIUM SERPL-SCNC: 3.7 MMOL/L (ref 3.5–5.3)
PR INTERVAL: 146 MS
QRS AXIS: 28 DEGREES
QRSD INTERVAL: 94 MS
QT INTERVAL: 356 MS
QTC INTERVAL: 423 MS
RBC # BLD AUTO: 4.87 MILLION/UL (ref 3.88–5.62)
SODIUM SERPL-SCNC: 141 MMOL/L (ref 135–147)
T WAVE AXIS: 13 DEGREES
TRIGL SERPL-MCNC: 144 MG/DL
VENTRICULAR RATE: 85 BPM
WBC # BLD AUTO: 5.88 THOUSAND/UL (ref 4.31–10.16)

## 2024-10-11 PROCEDURE — 70551 MRI BRAIN STEM W/O DYE: CPT

## 2024-10-11 PROCEDURE — 93010 ELECTROCARDIOGRAM REPORT: CPT | Performed by: INTERNAL MEDICINE

## 2024-10-11 PROCEDURE — 85025 COMPLETE CBC W/AUTO DIFF WBC: CPT | Performed by: NURSE PRACTITIONER

## 2024-10-11 PROCEDURE — 99232 SBSQ HOSP IP/OBS MODERATE 35: CPT | Performed by: INTERNAL MEDICINE

## 2024-10-11 PROCEDURE — 80048 BASIC METABOLIC PNL TOTAL CA: CPT | Performed by: NURSE PRACTITIONER

## 2024-10-11 PROCEDURE — 83036 HEMOGLOBIN GLYCOSYLATED A1C: CPT | Performed by: NURSE PRACTITIONER

## 2024-10-11 PROCEDURE — 80061 LIPID PANEL: CPT | Performed by: NURSE PRACTITIONER

## 2024-10-11 PROCEDURE — 99245 OFF/OP CONSLTJ NEW/EST HI 55: CPT | Performed by: PSYCHIATRY & NEUROLOGY

## 2024-10-11 RX ORDER — ATORVASTATIN CALCIUM 40 MG/1
40 TABLET, FILM COATED ORAL
Status: DISCONTINUED | OUTPATIENT
Start: 2024-10-11 | End: 2024-10-13 | Stop reason: HOSPADM

## 2024-10-11 RX ADMIN — ASPIRIN 81 MG CHEWABLE TABLET 81 MG: 81 TABLET CHEWABLE at 09:36

## 2024-10-11 NOTE — PLAN OF CARE
Problem: DISCHARGE PLANNING  Goal: Discharge to home or other facility with appropriate resources  Description: INTERVENTIONS:  - Identify barriers to discharge w/patient and caregiver  - Arrange for needed discharge resources and transportation as appropriate  - Identify discharge learning needs (meds, wound care, etc.)  - Arrange for interpretive services to assist at discharge as needed  - Refer to Case Management Department for coordinating discharge planning if the patient needs post-hospital services based on physician/advanced practitioner order or complex needs related to functional status, cognitive ability, or social support system  Outcome: Progressing     Problem: Knowledge Deficit  Goal: Patient/family/caregiver demonstrates understanding of disease process, treatment plan, medications, and discharge instructions  Description: Complete learning assessment and assess knowledge base.  Interventions:  - Provide teaching at level of understanding  - Provide teaching via preferred learning methods  Outcome: Progressing     Problem: NEUROSENSORY - ADULT  Goal: Achieves stable or improved neurological status  Description: INTERVENTIONS  - Monitor and report changes in neurological status  - Monitor vital signs such as temperature, blood pressure, glucose, and any other labs ordered   - Initiate measures to prevent increased intracranial pressure  - Monitor for seizure activity and implement precautions if appropriate      Outcome: Progressing  Goal: Remains free of injury related to seizures activity  Description: INTERVENTIONS  - Maintain airway, patient safety  and administer oxygen as ordered  - Monitor patient for seizure activity, document and report duration and description of seizure to physician/advanced practitioner  - If seizure occurs,  ensure patient safety during seizure  - Reorient patient post seizure  - Seizure pads on all 4 side rails  - Instruct patient/family to notify RN of any seizure  activity including if an aura is experienced  - Instruct patient/family to call for assistance with activity based on nursing assessment  - Administer anti-seizure medications if ordered    Outcome: Progressing  Goal: Achieves maximal functionality and self care  Description: INTERVENTIONS  - Monitor swallowing and airway patency with patient fatigue and changes in neurological status  - Encourage and assist patient to increase activity and self care.   - Encourage visually impaired, hearing impaired and aphasic patients to use assistive/communication devices  Outcome: Progressing     Problem: Neurological Deficit  Goal: Neurological status is stable or improving  Description: Interventions:  - Monitor and assess patient's level of consciousness, motor function, sensory function, and level of assistance needed for ADLs.   - Monitor and report changes from baseline. Collaborate with interdisciplinary team to initiate plan and implement interventions as ordered.   - Provide and maintain a safe environment.  - Consider seizure precautions.  - Consider fall precautions.  - Consider aspiration precautions.  - Consider bleeding precautions.  Outcome: Progressing     Problem: Potential for Aspiration  Goal: Non-ventilated patient's risk of aspiration is minimized  Description: Assess and monitor vital signs, respiratory status, and labs (WBC).  Monitor for signs of aspiration (tachypnea, cough, rales, wheezing, cyanosis, fever).    - Assess and monitor patient's ability to swallow.  - Place patient up in chair to eat if possible.  - HOB up at 90 degrees to eat if unable to get patient up into chair.  - Supervise patient during oral intake.   - Instruct patient/ family to take small bites.  - Instruct patient/ family to take small single sips when taking liquids.  - Follow patient-specific strategies generated by speech pathologist.  Outcome: Progressing     Problem: Nutrition  Goal: Nutrition/Hydration status is  improving  Description: Monitor and assess patient's nutrition/hydration status for malnutrition (ex- brittle hair, bruises, dry skin, pale skin and conjunctiva, muscle wasting, smooth red tongue, and disorientation). Collaborate with interdisciplinary team and initiate plan and interventions as ordered.  Monitor patient's weight and dietary intake as ordered or per policy. Utilize nutrition screening tool and intervene per policy. Determine patient's food preferences and provide high-protein, high-caloric foods as appropriate.     - Assist patient with eating.  - Allow adequate time for meals.  - Encourage patient to take dietary supplement as ordered.  - Collaborate with clinical nutritionist.  - Include patient/family/caregiver in decisions related to nutrition.  Outcome: Progressing

## 2024-10-11 NOTE — SPEECH THERAPY NOTE
Speech Language/Pathology  Orders acknowledged and received. Pt seen for dysphagia screen. No current complaints of dysphagia or speech difficulties. No difficulties noted with regular solid/thin liquids at bedside. No further needs identified. Please re consult with any concerns or s/s of aspiration.

## 2024-10-11 NOTE — PHYSICAL THERAPY NOTE
PHYSICAL THERAPY SCREEN          Patient Name: Maurisio Ramirez  Today's Date: 10/11/2024       10/11/24 1017   PT Last Visit   PT Visit Date 10/11/24   Note Type   Note type Screen   Additional Comments Per RN, pt is currently indep. no acute PT needs identified. will dc orders and be available for re-consult as appropriate.     Malorie White, PT

## 2024-10-11 NOTE — PLAN OF CARE
Problem: DISCHARGE PLANNING  Goal: Discharge to home or other facility with appropriate resources  Description: INTERVENTIONS:  - Identify barriers to discharge w/patient and caregiver  - Arrange for needed discharge resources and transportation as appropriate  - Identify discharge learning needs (meds, wound care, etc.)  - Arrange for interpretive services to assist at discharge as needed  - Refer to Case Management Department for coordinating discharge planning if the patient needs post-hospital services based on physician/advanced practitioner order or complex needs related to functional status, cognitive ability, or social support system  10/11/2024 1104 by Kiki Gonzales RN  Outcome: Progressing  10/11/2024 0730 by Kiki Gonzales RN  Outcome: Progressing     Problem: Knowledge Deficit  Goal: Patient/family/caregiver demonstrates understanding of disease process, treatment plan, medications, and discharge instructions  Description: Complete learning assessment and assess knowledge base.  Interventions:  - Provide teaching at level of understanding  - Provide teaching via preferred learning methods  10/11/2024 1104 by Kiki Gonzales RN  Outcome: Progressing  10/11/2024 0730 by Kiki Gonzales RN  Outcome: Progressing     Problem: NEUROSENSORY - ADULT  Goal: Achieves stable or improved neurological status  Description: INTERVENTIONS  - Monitor and report changes in neurological status  - Monitor vital signs such as temperature, blood pressure, glucose, and any other labs ordered   - Initiate measures to prevent increased intracranial pressure  - Monitor for seizure activity and implement precautions if appropriate      10/11/2024 1104 by Kiki Gonzales RN  Outcome: Progressing  10/11/2024 0730 by Kiki Gonzales RN  Outcome: Progressing  Goal: Remains free of injury related to seizures activity  Description: INTERVENTIONS  - Maintain airway, patient safety  and administer oxygen as  ordered  - Monitor patient for seizure activity, document and report duration and description of seizure to physician/advanced practitioner  - If seizure occurs,  ensure patient safety during seizure  - Reorient patient post seizure  - Seizure pads on all 4 side rails  - Instruct patient/family to notify RN of any seizure activity including if an aura is experienced  - Instruct patient/family to call for assistance with activity based on nursing assessment  - Administer anti-seizure medications if ordered    10/11/2024 1104 by Kiki Gonzales RN  Outcome: Progressing  10/11/2024 0730 by Kiki Gonzales RN  Outcome: Progressing  Goal: Achieves maximal functionality and self care  Description: INTERVENTIONS  - Monitor swallowing and airway patency with patient fatigue and changes in neurological status  - Encourage and assist patient to increase activity and self care.   - Encourage visually impaired, hearing impaired and aphasic patients to use assistive/communication devices  10/11/2024 1104 by Kiki Gonzales RN  Outcome: Progressing  10/11/2024 0730 by Kiki Gonzales RN  Outcome: Progressing     Problem: Neurological Deficit  Goal: Neurological status is stable or improving  Description: Interventions:  - Monitor and assess patient's level of consciousness, motor function, sensory function, and level of assistance needed for ADLs.   - Monitor and report changes from baseline. Collaborate with interdisciplinary team to initiate plan and implement interventions as ordered.   - Provide and maintain a safe environment.  - Consider seizure precautions.  - Consider fall precautions.  - Consider aspiration precautions.  - Consider bleeding precautions.  10/11/2024 1104 by Kiki Gonzales RN  Outcome: Progressing  10/11/2024 0730 by Kiki Gonzales RN  Outcome: Progressing     Problem: Potential for Aspiration  Goal: Non-ventilated patient's risk of aspiration is minimized  Description: Assess and  monitor vital signs, respiratory status, and labs (WBC).  Monitor for signs of aspiration (tachypnea, cough, rales, wheezing, cyanosis, fever).    - Assess and monitor patient's ability to swallow.  - Place patient up in chair to eat if possible.  - HOB up at 90 degrees to eat if unable to get patient up into chair.  - Supervise patient during oral intake.   - Instruct patient/ family to take small bites.  - Instruct patient/ family to take small single sips when taking liquids.  - Follow patient-specific strategies generated by speech pathologist.  10/11/2024 1104 by Kiki Gonzales RN  Outcome: Progressing  10/11/2024 0730 by Kiki Gonzales RN  Outcome: Progressing     Problem: Nutrition  Goal: Nutrition/Hydration status is improving  Description: Monitor and assess patient's nutrition/hydration status for malnutrition (ex- brittle hair, bruises, dry skin, pale skin and conjunctiva, muscle wasting, smooth red tongue, and disorientation). Collaborate with interdisciplinary team and initiate plan and interventions as ordered.  Monitor patient's weight and dietary intake as ordered or per policy. Utilize nutrition screening tool and intervene per policy. Determine patient's food preferences and provide high-protein, high-caloric foods as appropriate.     - Assist patient with eating.  - Allow adequate time for meals.  - Encourage patient to take dietary supplement as ordered.  - Collaborate with clinical nutritionist.  - Include patient/family/caregiver in decisions related to nutrition.  10/11/2024 1104 by Kiki Gonzales RN  Outcome: Progressing  10/11/2024 0730 by Kiki Gonzales RN  Outcome: Progressing

## 2024-10-11 NOTE — ASSESSMENT & PLAN NOTE
Presents to ED for evaluation of left facial numbness and paralysis, having some difficulty with speech and swallowing, unable to fully close left eye  Per neurology, not a TNK candidate due to nondisabling symptoms.  Recommended aspirin load and daily ASA 81mg daily  CTH and CTA unremarkable  Stroke pathway: Brain MRI and echo ordered  Permissive HTN  Obtain lipid panel and A1c  PT OT SLP consult  Neurology consult  Monitor on telemetry

## 2024-10-11 NOTE — H&P
"H&P - Hospitalist   Name: Maurisio Ramirez 28 y.o. male I MRN: [id number]  Unit/Bed#: E4 -01 I Date of Admission: 10/10/2024   Date of Service: 10/10/2024 I Hospital Day: 0     Assessment & Plan  Left facial numbness  Presents to ED for evaluation of left facial numbness and paralysis, having some difficulty with speech and swallowing, unable to fully close left eye  Per neurology, not a TNK candidate due to nondisabling symptoms.  Recommended aspirin load and daily ASA 81mg daily  CTH and CTA unremarkable  Stroke pathway: Brain MRI and echo ordered  Permissive HTN  Obtain lipid panel and A1c  PT OT SLP consult  Neurology consult  Monitor on telemetry  Hypokalemia  K 3.2. Give oral Kdur and repeat a.m BMP      VTE Pharmacologic Prophylaxis: VTE Score: 0 Low Risk (Score 0-2) - Encourage Ambulation.  Code Status: Level 1 - Full Code d/w patient  Discussion with family: Updated  (boyfriend) at bedside.    Anticipated Length of Stay: Patient will be admitted on an observation basis with an anticipated length of stay of less than 2 midnights secondary to left facial numbness and paralysis rule out CVA vs Bell's palsy.    History of Present Illness   Chief Complaint: \"numbness in my left face\"    Maurisio Ramirez is a 28 y.o. male with no significant PMH who presents with c/o L facial numbness, paralysis, difficulty with swallowing and speech, and inability to fully close his left eye.  States he was sleeping and woke up like this.  Denies headache, lightheadedness, confusion, facial droop, word finding difficulty, unilateral numbness/weakness, visual changes, or ataxia.    Review of Systems   Constitutional: Negative.    HENT:  Positive for trouble swallowing.    Eyes:  Negative for visual disturbance.   Respiratory: Negative.     Cardiovascular: Negative.    Gastrointestinal: Negative.    Genitourinary: Negative.    Musculoskeletal:  Negative for gait problem.   Neurological:  Positive for speech " difficulty and numbness. Negative for facial asymmetry, light-headedness and headaches.   Psychiatric/Behavioral:  Negative for confusion.        Historical Information   History reviewed. No pertinent past medical history.  Past Surgical History:   Procedure Laterality Date    NO PAST SURGERIES       Social History     Tobacco Use    Smoking status: Every Day     Current packs/day: 0.25     Types: Cigarettes    Smokeless tobacco: Never   Vaping Use    Vaping status: Never Used   Substance and Sexual Activity    Alcohol use: No    Drug use: No    Sexual activity: Not on file     E-Cigarette/Vaping    E-Cigarette Use Never User      E-Cigarette/Vaping Substances     History reviewed. No pertinent family history.  Social History:  Marital Status: Single   Occupation: welding  Patient Pre-hospital Living Situation: lives with boyfriend  Patient Pre-hospital Level of Mobility: walks  Patient Pre-hospital Diet Restrictions:     Meds/Allergies   No medications prior to admission.     No Known Allergies    Objective :  Temp:  [98.3 °F (36.8 °C)-98.5 °F (36.9 °C)] (P) 98.5 °F (36.9 °C)  HR:  [] (P) 63  BP: (143-176)/() (P) 129/86  Resp:  [18] (P) 18  SpO2:  [98 %-100 %] (P) 98 %  O2 Device: (P) None (Room air)    Physical Exam  Constitutional:       General: He is not in acute distress.     Appearance: Normal appearance. He is normal weight. He is not ill-appearing, toxic-appearing or diaphoretic.   HENT:      Head: Normocephalic and atraumatic.      Mouth/Throat:      Mouth: Mucous membranes are moist.   Eyes:      Extraocular Movements: Extraocular movements intact.      Pupils: Pupils are equal, round, and reactive to light.   Cardiovascular:      Rate and Rhythm: Normal rate and regular rhythm.      Heart sounds: Normal heart sounds.   Pulmonary:      Effort: Pulmonary effort is normal.      Breath sounds: Normal breath sounds.   Abdominal:      General: Bowel sounds are normal.      Palpations: Abdomen is  "soft.   Musculoskeletal:      Right lower leg: No edema.      Left lower leg: No edema.   Skin:     General: Skin is warm.   Neurological:      Mental Status: He is alert and oriented to person, place, and time.      Comments: Equal strength B/LUE 5/5 B/LLE 5/5  L facial weakness, unable to raise left brow and fully close left eye, mild droop corner of left mouth   EOMI, PERRLA   Psychiatric:         Mood and Affect: Mood normal.         Behavior: Behavior normal.         Thought Content: Thought content normal.         Judgment: Judgment normal.         Lines/Drains:            Lab Results: I have reviewed the following results:  Results from last 7 days   Lab Units 10/10/24  1932   WBC Thousand/uL 6.72   HEMOGLOBIN g/dL 16.0   HEMATOCRIT % 45.7   PLATELETS Thousands/uL 205   SEGS PCT % 53   LYMPHO PCT % 32   MONO PCT % 11   EOS PCT % 3     Results from last 7 days   Lab Units 10/10/24  1932   SODIUM mmol/L 140   POTASSIUM mmol/L 3.2*   CHLORIDE mmol/L 105   CO2 mmol/L 29   BUN mg/dL 12   CREATININE mg/dL 0.98   ANION GAP mmol/L 6   CALCIUM mg/dL 9.7   ALBUMIN g/dL 4.9   TOTAL BILIRUBIN mg/dL 0.89   ALK PHOS U/L 71   ALT U/L 41   AST U/L 25   GLUCOSE RANDOM mg/dL 99     Results from last 7 days   Lab Units 10/10/24  2001   INR  1.08     Results from last 7 days   Lab Units 10/10/24  1957   POC GLUCOSE mg/dl 61*     No results found for: \"HGBA1C\"        Imaging Results Review: I personally reviewed the following image studies in PACS and associated radiology reports: CT head. My interpretation of the radiology images/reports is: as above.  Other Study Results Review: No additional pertinent studies reviewed.    Administrative Statements       ** Please Note: This note has been constructed using a voice recognition system. **    "

## 2024-10-11 NOTE — CASE MANAGEMENT
Case Management Assessment & Discharge Planning Note    Patient name Maurisio Ramirez  [facility] /[facility] -* MRN [id number]  : 1995 Date 10/11/2024       Current Admission Date: 10/10/2024  Current Admission Diagnosis:Left facial numbness   Patient Active Problem List    Diagnosis Date Noted Date Diagnosed    Left facial numbness 10/10/2024     Hypokalemia 10/10/2024       LOS (days): 0  Geometric Mean LOS (GMLOS) (days):   Days to GMLOS:     OBJECTIVE:              Current admission status: Observation       Preferred Pharmacy:   [id number] - [place] - [address]  [address]  [place] [address]  Phone: [phone] Fax: [phone]    Primary Care Provider: No primary care provider on file.    Primary Insurance: [employer] [employer]  Secondary Insurance:     ASSESSMENT:  Active Health Care Proxies    There are no active Health Care Proxies on file.       Advance Directives  Does patient have a Health Care POA?:  (No)  Does patient have Advance Directives?:  (No)  Primary Contact: Valerio (significant other) [phone]              Patient Information  Admitted from:: Home  Mental Status: Alert  During Assessment patient was accompanied by: Not accompanied during assessment  Assessment information provided by:: Patient  Primary Caregiver: Self  Support Systems: Spouse/significant other  County of Residence: [place]  What [facility] do you live in?: [place]  Home entry access options. Select all that apply.: Elevator  Type of Current Residence: Apartment  Floor Level: 2  Upon entering residence, is there a bedroom on the main floor (no further steps)?: Yes  Upon entering residence, is there a bathroom on the main floor (no further steps)?: Yes  Living Arrangements: Lives w/ Spouse/significant other    Activities of Daily Living Prior to Admission  Functional Status: Independent  Completes ADLs independently?: Yes  Ambulates independently?: Yes  Does patient use  assisted devices?: No  Does patient currently own DME?: No  Does patient have a history of Outpatient Therapy (PT/OT)?: No  Does the patient have a history of Short-Term Rehab?: No  Does patient have a history of HHC?: No  Does patient currently have HHC?: No         Patient Information Continued  Does patient have prescription coverage?: No  Does patient receive dialysis treatments?: No  Does patient have a history of substance abuse?: No  Does patient have a history of Mental Health Diagnosis?: No         Means of Transportation  Means of Transport to Appts:: Drives Self          DISCHARGE DETAILS:    Discharge planning discussed with:: Patient        CM contacted family/caregiver?: No- see comments (declined need at this time)                  Requested Home Health Care         Is the patient interested in HHC at discharge?: No    DME Referral Provided  Referral made for DME?: No    Other Referral/Resources/Interventions Provided:  Interventions: None Indicated         Treatment Team Recommendation: Home  Discharge Destination Plan:: Home  Transport at Discharge : Family                                      Additional Comments: Pt IPTA. No PT/OT recommendations. Pt does not have insurance, PATHS referral previously made. Informed pt he will need to follow up to apply for MA. He does not have PCP. Discussed establishing with [facility] as they do sliding scale fee. Pt agreeable and prefers afternoon appt. CM called [facility] and scheduled new pt appt for 10/30 at 1:40pm. Reviewed appt time with patient and placed information/instructions on AVS. Pt requested significant other, Garrettcy, be added as primary contact to chart ([phone]). Valerio will transport pt home at time of d/c. No further needs identified at this time.

## 2024-10-11 NOTE — OCCUPATIONAL THERAPY NOTE
Occupational Therapy Screen         Patient Name: Maurisio Ramirez  Today's Date: 10/11/2024       10/11/24 0836   OT Last Visit   OT Visit Date 10/11/24   Note Type   Note type Screen   Additional Comments Consult received. Chart reviewed. Screen completed. Per pt and RN, pt at his functional baseline for ADLs, mobility and transfers. No Skilled OT warranted at this time. DC OT. Re-consult as appropriate.     Jud Braun, OT

## 2024-10-11 NOTE — CONSULTS
"Consultation - Neurology   Name: Maurisio Ramirez 28 y.o. male I MRN: [id number]  Unit/Bed#: E4 -01 I Date of Admission: 10/10/2024   Date of Service: 10/11/2024 I Hospital Day: 0   Inpatient consult to Neurology  Consult performed by: Yamilet Kebede PA-C  Consult ordered by: MIMI Macias        Physician Requesting Evaluation: Jana Valencia MD   Reason for Evaluation / Principal Problem: stroke like symptoms     Assessment & Plan  TIA (transient ischemic attack)  28 y.o.  male with no documented medical comorbidities who presents to [facility] on 10/10/24 with L facial weakness and numbness. A stroke alert was initiated by the ED. BP on arrival 176/108. Of note, SBP improved to low 150s/high 140s within 1 hour without treatment. NIHSS per ED 2. CTH wo contrast revealed no acute intracranial abnormality. CTA H/N wwo contrast revealed no IR target. Pt was not a thrombolytic candidate due to low NIHSS/non-disabling symptoms. Pt was loaded with  mg x 1 and admitted on stroke pathway.     Workup   - CTH wo contrast 10/10/24:  \"No acute intracranial abnormality.\"  - CTA H/N wwo contrast 10/10/24:  \"Unremarkable CTA neck and brain. \"  - On personal read, no acute stroke on MRI brain 10/11/24  - Labs:   - hemoglobin A1c: 5.2   - total cholesterol 168,     Plan  - Stroke pathway  MRI brain   Echo pending   S/p  mg x 1 on 10/10/24. Start Aspirin 81 mg daily on 10/11/24  Atorvastatin 40 mg daily   Permissive HTN, allow for SBP up to 220 x 24 hours from onset of stroke like symptoms, then goal normotension. Goal normotension sooner if MRI brain completed and does not show acute infarct   Euglycemic, normothermic goal  Continue telemetry  PT/OT/ST  Stroke education  Frequent neuro checks. Continue to monitor and notify neurology with any changes.  STAT CT head for any acute change in neuro exam  - Medical management and supportive care per primary team. Correction of any metabolic or " infectious disturbances.       Maurisio Ramirez will need follow-up in in 6 weeks with neurovascular attending for TIA in 60 minute appointment. They will not require outpatient neurological testing.         Message sent to schedulers.       Case and plan discussed with attending neurologist.  Please see attending attestation for any further recommendations and/or changes to plan.      History of Present Illness   Maurisio Ramirez is a 28 y.o. right handed male with no documented medical comorbidities who presents to [facility] on 10/10/24 with L facial weakness and numbness. A stroke alert was initiated by the ED. BP on arrival 176/108. Of note, SBP improved to low 150s/high 140s within 1 hour without treatment. NIHSS per ED 2. CTH wo contrast revealed no acute intracranial abnormality. CTA H/N wwo contrast revealed no IR target. Pt was not a thrombolytic candidate due to low NIHSS/non-disabling symptoms. Pt was loaded with  mg x 1 and admitted on stroke pathway.     Patient reports sometime around 530 last night, he noticed the left side of his face looked weak and had water dribble out of the left side of his lip when trying to drink.  Patient reports at the same time, he noticed that he was unable to feel sensation in the left side of his face.  Patient did not eat anything around that time to notice if taste was changed with the symptoms.  Patient did not notice change in hearing.  Patient reports no similar events in the past.  Patient denies appendicular weakness/other numbness/tingling, speech trouble, vision change, gait trouble, dizziness, and/or headache at this time.  Patient reports he is antiplatelet/anticoagulant naïve prior to arrival.  Patient presented to ED for further evaluation.    Patient reports numbness/tingling in left facial weakness were resolved when he woke up this morning.  Patient reports feeling at his baseline this morning.    Patient denies autoimmune illnesses.  Patient denies recent  illnesses, including but not limited to GI disturbance, URI.  Patient denies recent tick/mosquito bites.  Patient denies recent camping trips.  Patient denies recent extended time in outdoors or in wooded area. Pt denies family history of bleeding/clotting disorders. Pt reports his mother had stroke in age <50 years old. Pt denies illicit drug use.     Patient is independent of ADLs and IADLs at baseline.        Review of Systems   Neurological:  Negative for facial asymmetry, weakness and numbness.        I have reviewed the patient's PMH, PSH, Social History, Family History, Meds, and Allergies  Historical Information   History reviewed. No pertinent past medical history.  Past Surgical History:   Procedure Laterality Date    NO PAST SURGERIES       Social History     Tobacco Use    Smoking status: Every Day     Current packs/day: 0.25     Types: Cigarettes    Smokeless tobacco: Never   Vaping Use    Vaping status: Never Used   Substance and Sexual Activity    Alcohol use: No    Drug use: No    Sexual activity: Not on file     E-Cigarette/Vaping    E-Cigarette Use Never User      E-Cigarette/Vaping Substances     History reviewed. No pertinent family history.  Social History     Tobacco Use    Smoking status: Every Day     Current packs/day: 0.25     Types: Cigarettes    Smokeless tobacco: Never   Vaping Use    Vaping status: Never Used   Substance and Sexual Activity    Alcohol use: No    Drug use: No    Sexual activity: Not on file       Current Facility-Administered Medications:     acetaminophen (TYLENOL) tablet 975 mg, Q6H PRN    aluminum-magnesium hydroxide-simethicone (MAALOX) oral suspension 30 mL, Q6H PRN    aspirin chewable tablet 81 mg, Daily    atorvastatin (LIPITOR) tablet 40 mg, Daily With Dinner    ondansetron (ZOFRAN) injection 4 mg, Q6H PRN  None     Patient has no known allergies.    Objective :  Temp:  [97.5 °F (36.4 °C)-98.6 °F (37 °C)] 97.5 °F (36.4 °C)  HR:  [] 67  BP:  (118-176)/() 129/78  Resp:  [16-18] 16  SpO2:  [98 %-100 %] 99 %  O2 Device: None (Room air)    Physical Exam  Vitals and nursing note reviewed.   HENT:      Head: Normocephalic and atraumatic.      Mouth/Throat:      Comments: +false teeth  Eyes:      Extraocular Movements: Extraocular movements intact. No nystagmus.   Cardiovascular:      Rate and Rhythm: Normal rate.   Pulmonary:      Effort: Pulmonary effort is normal.   Neurological:      Mental Status: He is alert.      Cranial Nerves: No dysarthria.   Psychiatric:      Comments: Pleasant and cooperative during exam      Neurological Exam  Mental Status  Alert. Oriented to person, place and time. no dysarthria present. Able to name objects and repeat. Follows two-step commands.    Cranial Nerves  CN II: Visual fields full to confrontation.  CN III, IV, VI: Extraocular movements intact bilaterally. No nystagmus.  CN V: Facial sensation is normal.  CN VII: Full and symmetric facial movement.  CN VIII:  Right: Grossly intact .  CN XII: Tongue midline without atrophy or fasciculations.    Motor  Normal muscle bulk throughout. Normal muscle tone.                                               Right                     Left   Shoulder abduction               5                          5  Elbow flexion                         5                          5  Elbow extension                    5                          5  Finger flexion                         5                          5  Hip flexion                              5                          5  Plantarflexion                         5                          5  Dorsiflexion                            5                          5    Sensory  Light touch is normal in upper and lower extremities.  Right extinction absent: Left extinction absent:    Coordination  Right: Finger-to-nose normal. Heel-to-shin normal.Left: Finger-to-nose normal. Heel-to-shin normal.        Lab Results: I have reviewed the  "following results:I have personally reviewed pertinent reports.  , CBC:   Results from last 7 days   Lab Units 10/11/24  0426 10/10/24  1932   WBC Thousand/uL 5.88 6.72   RBC Million/uL 4.87 5.21   HEMOGLOBIN g/dL 14.6 16.0   HEMATOCRIT % 41.6 45.7   MCV fL 85 88   PLATELETS Thousands/uL 188 205   , BMP/CMP:   Results from last 7 days   Lab Units 10/11/24  0426 10/10/24  1932   SODIUM mmol/L 141 140   POTASSIUM mmol/L 3.7 3.2*   CHLORIDE mmol/L 108 105   CO2 mmol/L 27 29   BUN mg/dL 10 12   CREATININE mg/dL 0.94 0.98   CALCIUM mg/dL 9.0 9.7   AST U/L  --  25   ALT U/L  --  41   ALK PHOS U/L  --  71   EGFR ml/min/1.73sq m 108 103   , Vitamin B12:   , HgBA1C:   Results from last 7 days   Lab Units 10/11/24  0426   HEMOGLOBIN A1C % 5.2   , TSH:   , Coagulation:   Results from last 7 days   Lab Units 10/10/24  2001   INR  1.08   , Lipid Profile:   Results from last 7 days   Lab Units 10/11/24  0426   HDL mg/dL 31*   LDL CALC mg/dL 108*   TRIGLYCERIDES mg/dL 144   , Ammonia:   , Urinalysis:       Invalid input(s): \"URIBILINOGEN\", Drug Screen:   , Medication Drug Levels:       Invalid input(s): \"CARBAMAZEPINE\", \"OXCARBAZEPINE\"  Recent Labs     10/11/24  0426   WBC 5.88   HGB 14.6   HCT 41.6      SODIUM 141   K 3.7      CO2 27   BUN 10   CREATININE 0.94   GLUC 91     Imaging Results Review: I personally reviewed the following image studies in PACS and associated radiology reports: CTA H/N wwo contrast and CT head. My interpretation of the radiology images/reports is: congruent with that documented by radiologist in report.  Other Study Results Review: EKG was reviewed.     VTE Prophylaxis: Sequential compression device (Venodyne)     This note was completed in part utilizing Dragon Software.  Grammatical errors, random word insertions, spelling mistakes, and incomplete sentences may be an occasional consequence of this system secondary to software limitations, ambient noise, and hardware issues.  If you have " any questions or concerns about the content, text, or information contained within the body of this dictation, please contact the provider for clarification.

## 2024-10-11 NOTE — ASSESSMENT & PLAN NOTE
"28 y.o.  male with no documented medical comorbidities who presents to [facility] on 10/10/24 with L facial weakness and numbness. A stroke alert was initiated by the ED. BP on arrival 176/108. Of note, SBP improved to low 150s/high 140s within 1 hour without treatment. NIHSS per ED 2. CTH wo contrast revealed no acute intracranial abnormality. CTA H/N wwo contrast revealed no IR target. Pt was not a thrombolytic candidate due to low NIHSS/non-disabling symptoms. Pt was loaded with  mg x 1 and admitted on stroke pathway.     Workup   - CTH wo contrast 10/10/24:  \"No acute intracranial abnormality.\"  - CTA H/N wwo contrast 10/10/24:  \"Unremarkable CTA neck and brain. \"  - On personal read, no acute stroke on MRI brain 10/11/24  - Labs:   - hemoglobin A1c: 5.2   - total cholesterol 168,     Plan  - Stroke pathway  MRI brain   Echo pending   S/p  mg x 1 on 10/10/24. Start Aspirin 81 mg daily on 10/11/24  Atorvastatin 40 mg daily   Permissive HTN, allow for SBP up to 220 x 24 hours from onset of stroke like symptoms, then goal normotension. Goal normotension sooner if MRI brain completed and does not show acute infarct   Euglycemic, normothermic goal  Continue telemetry  PT/OT/ST  Stroke education  Frequent neuro checks. Continue to monitor and notify neurology with any changes.  STAT CT head for any acute change in neuro exam  - Medical management and supportive care per primary team. Correction of any metabolic or infectious disturbances.       Maurisio James will need follow-up in in 6 weeks with neurovascular attending for TIA in 60 minute appointment. They will not require outpatient neurological testing.         Message sent to schedulers.       "

## 2024-10-11 NOTE — PROGRESS NOTES
Progress Note - Hospitalist   Name: Maurisio Ramirez 28 y.o. male I MRN: [id number]  Unit/Bed#: E4 -01 I Date of Admission: 10/10/2024   Date of Service: 10/11/2024 I Hospital Day: 0    Assessment & Plan  TIA (transient ischemic attack)  Patient is a 28-year-old male without significant past medical history.  Presents to ED for evaluation of left facial numbness and paralysis, having some difficulty with speech and swallowing, unable to fully close left eye  Per neurology, not a TNK candidate due to nondisabling symptoms.    CT head showed no acute intracranial abnormality  CTA head and neck unremarkable  Hemoglobin A1c 5.2    MRI brain showed no acute infarction, edema or mass effect  Echo pending  Continue aspirin 81 mg daily and Lipitor 40 mg daily  Hypokalemia  Replaced, resolved  Hyperlipidemia    Continue Lipitor 40 mg daily    VTE Pharmacologic Prophylaxis: VTE Score: 0 Low Risk (Score 0-2) - Encourage Ambulation.    Mobility:   Basic Mobility Inpatient Raw Score: 24  JH-HLM Goal: 8: Walk 250 feet or more  JH-HLM Achieved: 2: Bed activities/Dependent transfer  JH-HLM Goal achieved. Continue to encourage appropriate mobility.    Patient Centered Rounds:  discussed with nursing    Discussions with Specialists or Other Care Team Provider: Neurology, nursing, case management    Education and Discussions with Family / Patient:  Patient.     Current Length of Stay: 0 day(s)  Current Patient Status: Observation   Certification Statement: The patient will continue to require additional inpatient hospital stay due to TIA, pending echo  Discharge Plan: Anticipate discharge tomorrow to home.    Code Status: Level 1 - Full Code    Subjective   Patient was seen evaluated bedside, all symptoms resolved    Objective :  Temp:  [97.5 °F (36.4 °C)-98.6 °F (37 °C)] 97.5 °F (36.4 °C)  HR:  [] 67  BP: (118-176)/() 129/78  Resp:  [16-18] 16  SpO2:  [98 %-100 %] 99 %  O2 Device: None (Room air)    There  is no height or weight on file to calculate BMI.     Input and Output Summary (last 24 hours):     Intake/Output Summary (Last 24 hours) at 10/11/2024 1709  Last data filed at 10/11/2024 1300  Gross per 24 hour   Intake 400 ml   Output --   Net 400 ml       Physical Exam  Constitutional:       General: He is not in acute distress.  HENT:      Head: Atraumatic.   Cardiovascular:      Rate and Rhythm: Normal rate and regular rhythm.      Heart sounds: No murmur heard.  Pulmonary:      Effort: Pulmonary effort is normal. No respiratory distress.      Breath sounds: Normal breath sounds. No wheezing.   Abdominal:      General: Bowel sounds are normal. There is no distension.      Palpations: Abdomen is soft.      Tenderness: There is no abdominal tenderness.   Musculoskeletal:         General: No swelling.      Cervical back: Neck supple.   Skin:     General: Skin is warm and dry.   Neurological:      General: No focal deficit present.      Mental Status: He is alert and oriented to person, place, and time.      Cranial Nerves: No cranial nerve deficit.      Sensory: No sensory deficit.      Motor: No weakness.   Psychiatric:         Mood and Affect: Mood normal.           Lines/Drains:        Telemetry:  Telemetry Orders (From admission, onward)               24 Hour Telemetry Monitoring  Continuous x 24 Hours (Telem)        Expiring   Question:  Reason for 24 Hour Telemetry  Answer:  TIA/Suspected CVA/ Confirmed CVA                     Telemetry Reviewed: Normal Sinus Rhythm  Indication for Continued Telemetry Use: Acute CVA               Lab Results: I have reviewed the following results:   Results from last 7 days   Lab Units 10/11/24  0426   WBC Thousand/uL 5.88   HEMOGLOBIN g/dL 14.6   HEMATOCRIT % 41.6   PLATELETS Thousands/uL 188   SEGS PCT % 54   LYMPHO PCT % 30   MONO PCT % 12   EOS PCT % 4     Results from last 7 days   Lab Units 10/11/24  0426 10/10/24  1932   SODIUM mmol/L 141 140   POTASSIUM mmol/L 3.7  3.2*   CHLORIDE mmol/L 108 105   CO2 mmol/L 27 29   BUN mg/dL 10 12   CREATININE mg/dL 0.94 0.98   ANION GAP mmol/L 6 6   CALCIUM mg/dL 9.0 9.7   ALBUMIN g/dL  --  4.9   TOTAL BILIRUBIN mg/dL  --  0.89   ALK PHOS U/L  --  71   ALT U/L  --  41   AST U/L  --  25   GLUCOSE RANDOM mg/dL 91 99     Results from last 7 days   Lab Units 10/10/24  2001   INR  1.08     Results from last 7 days   Lab Units 10/10/24  1957   POC GLUCOSE mg/dl 61*     Results from last 7 days   Lab Units 10/11/24  0426   HEMOGLOBIN A1C % 5.2           Recent Cultures (last 7 days):         Imaging Results Review: I reviewed radiology reports from this admission including: CT head and MRI brain.  Other Study Results Review: EKG was reviewed.     Last 24 Hours Medication List:     Current Facility-Administered Medications:     acetaminophen (TYLENOL) tablet 975 mg, Q6H PRN    aluminum-magnesium hydroxide-simethicone (MAALOX) oral suspension 30 mL, Q6H PRN    aspirin chewable tablet 81 mg, Daily    atorvastatin (LIPITOR) tablet 40 mg, Daily With Dinner    ondansetron (ZOFRAN) injection 4 mg, Q6H PRN    Administrative Statements   Today, Patient Was Seen By: Jana Valencia MD      **Please Note: This note may have been constructed using a voice recognition system.**

## 2024-10-11 NOTE — ED PROVIDER NOTES
Time reflects when diagnosis was documented in both MDM as applicable and the Disposition within this note       Time User Action Codes Description Comment    10/10/2024  7:34 PM Kayla Coley Add [R29.810] Facial droop     10/10/2024  9:39 PM Barb Medina Add [R20.0] Left facial numbness           ED Disposition       ED Disposition   Admit    Condition   Stable    Date/Time   u Oct 10, 2024  8:33 PM    Comment   Case was discussed with MARIELOS and the patient's admission status was agreed to be Admission Status: obs status to the service of Dr. Harp               Assessment & Plan       Medical Decision Making  Pt admitted on stroke pathway     Amount and/or Complexity of Data Reviewed  Labs: ordered.  Radiology: ordered. Decision-making details documented in ED Course.    Risk  OTC drugs.  Prescription drug management.  Decision regarding hospitalization.        ED Course as of 10/10/24 2213   Thu Oct 10, 2024   2003 CT stroke alert brain  No acute intracranial abnormality.     Findings were directly discussed with Mikael Arenas at approximately 8:01 p.m. 10/10/2024.     2003 Per neuro load with 325 mg asa and admit on stroke pathway. Pt not a TNK candidate.    2007 CTA stroke alert (head/neck)  Unremarkable CTA neck and brain.       Medications   iohexol (OMNIPAQUE) 350 MG/ML injection (MULTI-DOSE) 85 mL (85 mL Intravenous Given 10/10/24 1948)   aspirin chewable tablet 324 mg (324 mg Oral Given 10/10/24 2010)       ED Risk Strat Scores                           SBIRT 20yo+      Flowsheet Row Most Recent Value   Initial Alcohol Screen: US AUDIT-C     1. How often do you have a drink containing alcohol? 0 Filed at: 10/10/2024 1921   2. How many drinks containing alcohol do you have on a typical day you are drinking?  0 Filed at: 10/10/2024 1921   3a. Male UNDER 65: How often do you have five or more drinks on one occasion? 0 Filed at: 10/10/2024 1921   Audit-C Score 0 Filed at: 10/10/2024 1921   RONNIE: How many  times in the past year have you...    Used an illegal drug or used a prescription medication for non-medical reasons? Never Filed at: 10/10/2024 1921                            History of Present Illness       Chief Complaint   Patient presents with    Facial Numbness     Pt reports L facial numbness starting 2 hours pta. Pt reports is unable to move R side of face.        History reviewed. No pertinent past medical history.   Past Surgical History:   Procedure Laterality Date    NO PAST SURGERIES        History reviewed. No pertinent family history.   Social History     Tobacco Use    Smoking status: Every Day     Current packs/day: 0.25     Types: Cigarettes    Smokeless tobacco: Never   Vaping Use    Vaping status: Never Used   Substance Use Topics    Alcohol use: No    Drug use: No      E-Cigarette/Vaping    E-Cigarette Use Never User       E-Cigarette/Vaping Substances      I have reviewed and agree with the history as documented.     28 YOM with no documented PMH presents today with facial asymmetry starting around 540 pm. Reports sudden onset. States that he feels numb on the left side of his face. Denies any other symptoms at this time.         Review of Systems   Constitutional:  Negative for chills and fever.   Gastrointestinal:  Negative for abdominal pain, diarrhea, nausea and vomiting.   Neurological:  Positive for facial asymmetry. Negative for dizziness, weakness, light-headedness and headaches.           Objective       ED Triage Vitals [10/10/24 1920]   Temperature Pulse Blood Pressure Respirations SpO2 Patient Position - Orthostatic VS   98.3 °F (36.8 °C) 101 (!) 176/108 18 100 % Lying      Temp Source Heart Rate Source BP Location FiO2 (%) Pain Score    Oral Monitor Right arm -- --      Vitals      Date and Time Temp Pulse SpO2 Resp BP Pain Score FACES Pain Rating User   10/10/24 2055 -- 74 98 % 18 144/99 -- --    10/10/24 2025 98.4 °F (36.9 °C) 82 98 % 18 143/97 -- --    10/10/24 2010 98.5 °F  (36.9 °C) 81 98 % 18 148/100 -- --    10/10/24 1950 -- 82 100 % 18 152/86 -- --    10/10/24 1940 -- 86 100 % -- -- -- --    10/10/24 1930 -- 96 100 % 18 170/104 -- --    10/10/24 1920 98.3 °F (36.8 °C) 101 100 % 18 176/108 -- -- AA            Physical Exam  Vitals and nursing note reviewed.   Constitutional:       General: He is not in acute distress.     Appearance: Normal appearance. He is well-developed. He is not ill-appearing.   HENT:      Head: Normocephalic and atraumatic.   Eyes:      Conjunctiva/sclera: Conjunctivae normal.   Cardiovascular:      Rate and Rhythm: Normal rate.   Pulmonary:      Effort: Pulmonary effort is normal.   Musculoskeletal:         General: No swelling. Normal range of motion.      Cervical back: Normal range of motion and neck supple.   Skin:     General: Skin is warm and dry.      Capillary Refill: Capillary refill takes less than 2 seconds.   Neurological:      General: No focal deficit present.      Mental Status: He is alert and oriented to person, place, and time.      GCS: GCS eye subscore is 4. GCS verbal subscore is 5. GCS motor subscore is 6.      Cranial Nerves: Facial asymmetry present.      Motor: Motor function is intact.      Coordination: Coordination is intact.      Comments: When pt is relaxed his left side of the face seems to be in spasm, eye appears to be squinting. However when smiling can notice right sided facial droop. Subjective left sided facial numbness. Tongue deviates to the left.    Psychiatric:         Mood and Affect: Mood normal.         Behavior: Behavior normal.         Results Reviewed       Procedure Component Value Units Date/Time    HS Troponin 0hr (reflex protocol) [[id number]]  (Normal) Collected: 10/10/24 2001    Lab Status: Final result Specimen: Blood from Arm, Right Updated: 10/10/24 2041     hs TnI 0hr 3 ng/L     Protime-INR [[id number]]  (Normal) Collected: 10/10/24 2001    Lab Status: Final result Specimen: Blood from Arm, Right  Updated: 10/10/24 2033     Protime 14.2 seconds      INR 1.08    Narrative:      INR Therapeutic Range    Indication                                             INR Range      Atrial Fibrillation                                               2.0-3.0  Hypercoagulable State                                    2.0.2.3  Left Ventricular Asist Device                            2.0-3.0  Mechanical Heart Valve                                  -    Aortic(with afib, MI, embolism, HF, LA enlargement,    and/or coagulopathy)                                     2.0-3.0 (2.5-3.5)     Mitral                                                             2.5-3.5  Prosthetic/Bioprosthetic Heart Valve               2.0-3.0  Venous thromboembolism (VTE: VT, PE        2.0-3.0    APTT [[id number]]  (Normal) Collected: 10/10/24 2001    Lab Status: Final result Specimen: Blood from Arm, Right Updated: 10/10/24 2033     PTT 27 seconds     Comprehensive metabolic panel [[id number]]  (Abnormal) Collected: 10/10/24 1932    Lab Status: Final result Specimen: Blood from Arm, Right Updated: 10/10/24 2014     Sodium 140 mmol/L      Potassium 3.2 mmol/L      Chloride 105 mmol/L      CO2 29 mmol/L      ANION GAP 6 mmol/L      BUN 12 mg/dL      Creatinine 0.98 mg/dL      Glucose 99 mg/dL      Calcium 9.7 mg/dL      AST 25 U/L      ALT 41 U/L      Alkaline Phosphatase 71 U/L      Total Protein 7.7 g/dL      Albumin 4.9 g/dL      Total Bilirubin 0.89 mg/dL      eGFR 103 ml/min/1.73sq m     Narrative:      [employer] guidelines for Chronic Kidney Disease (CKD):     Stage 1 with normal or high GFR (GFR > 90 mL/min/1.73 square meters)    Stage 2 Mild CKD (GFR = 60-89 mL/min/1.73 square meters)    Stage 3A Moderate CKD (GFR = 45-59 mL/min/1.73 square meters)    Stage 3B Moderate CKD (GFR = 30-44 mL/min/1.73 square meters)    Stage 4 Severe CKD (GFR = 15-29 mL/min/1.73 square meters)    Stage 5 End Stage CKD (GFR <15 mL/min/1.73 square  meters)  Note: GFR calculation is accurate only with a steady state creatinine    Fingerstick Glucose (POCT) [[id number]]  (Abnormal) Collected: 10/10/24 1957    Lab Status: Final result Specimen: Blood Updated: 10/10/24 1958     POC Glucose 61 mg/dl     CBC and differential [[id number]] Collected: 10/10/24 1932    Lab Status: Final result Specimen: Blood from Arm, Right Updated: 10/10/24 1943     WBC 6.72 Thousand/uL      RBC 5.21 Million/uL      Hemoglobin 16.0 g/dL      Hematocrit 45.7 %      MCV 88 fL      MCH 30.7 pg      MCHC 35.0 g/dL      RDW 12.1 %      MPV 10.0 fL      Platelets 205 Thousands/uL      nRBC 0 /100 WBCs      Segmented % 53 %      Immature Grans % 0 %      Lymphocytes % 32 %      Monocytes % 11 %      Eosinophils Relative 3 %      Basophils Relative 1 %      Absolute Neutrophils 3.57 Thousands/µL      Absolute Immature Grans 0.02 Thousand/uL      Absolute Lymphocytes 2.15 Thousands/µL      Absolute Monocytes 0.71 Thousand/µL      Eosinophils Absolute 0.22 Thousand/µL      Basophils Absolute 0.05 Thousands/µL     LYME TOTAL AB W REFLEX TO IGM/IGG [[id number]] Collected: 10/10/24 1932    Lab Status: In process Specimen: Blood from Arm, Right Updated: 10/10/24 1940    Narrative:      The following orders were created for panel order LYME TOTAL AB W REFLEX TO IGM/IGG.  Procedure                               Abnormality         Status                     ---------                               -----------         ------                     [place] Total AB W Reflex to...[[id number]]                      In process                   Please view results for these tests on the individual orders.    Lyme Total AB W Reflex to IGM/IGG [[id number]] Collected: 10/10/24 1932    Lab Status: In process Specimen: Blood from Arm, Right Updated: 10/10/24 1940            CTA stroke alert (head/neck)   Final Interpretation by Zafar Sandhu MD (10/10 2004)      Unremarkable CTA neck and brain.               Findings were  directly discussed with Mikael Arenas at 8:03 p.m. 10/10/2024.      Workstation performed: [id number]         CT stroke alert brain   Final Interpretation by Zafar Sandhu MD (10/10 2001)      No acute intracranial abnormality.      Findings were directly discussed with Mikael Arenas at approximately 8:01 p.m. 10/10/2024.      Workstation performed: [id number]         MRI Inpatient Order    (Results Pending)       Procedures    ED Medication and Procedure Management   None     There are no discharge medications for this patient.    No discharge procedures on file.  ED SEPSIS DOCUMENTATION   Time reflects when diagnosis was documented in both MDM as applicable and the Disposition within this note       Time User Action Codes Description Comment    10/10/2024  7:34 PM Kayla Coley Add [R29.810] Facial droop     10/10/2024  9:39 PM Barb Medina Add [R20.0] Left facial numbness                  Kayla Coley PA-C  10/10/24 1523

## 2024-10-11 NOTE — UTILIZATION REVIEW
Initial Clinical Review    Admission: Date/Time/Statement:   Admission Orders (From admission, onward)       Ordered        10/10/24 2033  Place in Observation  Once                          Orders Placed This Encounter   Procedures    Place in Observation     Standing Status:   Standing     Number of Occurrences:   1     Order Specific Question:   Level of Care     Answer:   Med Surg [16]     ED Arrival Information       Expected   -    Arrival   10/10/2024 19:13    Acuity   Emergent              Means of arrival   Walk-In    Escorted by   Self    Service   Hospitalist    Admission type   Emergency              Arrival complaint   Facial Droop             Chief Complaint   Patient presents with    Facial Numbness     Pt reports L facial numbness starting 2 hours pta. Pt reports is unable to move R side of face.        Initial Presentation: 28 y.o. male presents to ed from home on 10-10-24 for evaluation and treatment L facial numbness starting 2 hrs prior to admission.  Stroke alert initiated.  Found to have hypertension 176/108/  SBP improved to 140s in 1 hr without treatment.  NIHSS 2.  Imaging without acute finding.  Not a thrombolytic candidate.  Loaded ASA.  Admit to observation for stroke like symptoms:  plan includes telemetry, neuro check, goal normotension sooner if MRI brain completed and does not show acute infarct, PT/OT/ST, MRI.    Anticipated Length of Stay/Certification Statement: Patient will be admitted on an observation basis with an anticipated length of stay of less than 2 midnights secondary to left facial numbness and paralysis rule out CVA vs Bell's palsy.     Date: 10-11-24    Day 2: observation  PT/ OT/ ST no functional deficits.       ED Treatment-Medication Administration from 10/10/2024 1913 to 10/10/2024 2110         Date/Time Order Dose Route Action     10/10/2024 2010 aspirin chewable tablet 324 mg 324 mg Oral Given         Scheduled Medications:  aspirin, 81 mg, Oral,  Daily  atorvastatin, 40 mg, Oral, Daily With Dinner      Continuous IV Infusions:     PRN Meds:  acetaminophen, 975 mg, Oral, Q6H PRN  aluminum-magnesium hydroxide-simethicone, 30 mL, Oral, Q6H PRN  ondansetron, 4 mg, Intravenous, Q6H PRN      ED Triage Vitals   Temperature Pulse Respirations Blood Pressure SpO2 Pain Score   10/10/24 1920 10/10/24 1920 10/10/24 1920 10/10/24 1920 10/10/24 1920 10/10/24 2115   98.3 °F (36.8 °C) 101 18 (!) 176/108 100 % No Pain     Weight (last 2 days)       Date/Time Weight    10/10/24 2115 77.2 (170.2)    10/10/24 1920 77.2 (170.2)            Vital Signs (last 3 days)       Date/Time Temp Pulse Resp BP MAP (mmHg) SpO2 O2 Device [place] Coma Scale Score Pain    10/11/24 1129 97.5 °F (36.4 °C) 67 16 129/78 98 99 % None (Room air) -- --    10/11/24 0815 97.6 °F (36.4 °C) 68 16 138/78 98 98 % None (Room air) 15 --    10/11/24 0715 -- -- -- -- -- -- -- -- No Pain    10/11/24 0615 -- -- -- -- -- -- -- 15 --    10/11/24 0613 98.1 °F (36.7 °C) 67 18 135/73 98 99 % None (Room air) -- --    10/11/24 0415 98.6 °F (37 °C) 66 18 120/72 90 98 % None (Room air) 15 --    10/11/24 0214 98.4 °F (36.9 °C) 65 18 126/70 90 98 % None (Room air) 15 --    10/11/24 0015 98.2 °F (36.8 °C) 67 18 118/85 96 98 % None (Room air) 15 --    10/10/24 2315 -- -- -- -- -- -- -- 15 --    10/10/24 2314 98.4 °F (36.9 °C) 58 18 147/77 107 99 % None (Room air) -- --    10/10/24 2215 -- -- -- -- -- -- -- 15 --    10/10/24 2213 98.1 °F (36.7 °C) 67 18 134/83 101 98 % None (Room air) -- --    10/10/24 2115 98.5 °F (36.9 °C) 63 18 129/86 103 98 % None (Room air) 15 No Pain    10/10/24 2055 -- 74 18 144/99 -- 98 % None (Room air) -- --    10/10/24 20:25:40 98.4 °F (36.9 °C) 82 18 143/97 -- 98 % None (Room air) 15 --    10/10/24 2010 98.5 °F (36.9 °C) 81 18 148/100 -- 98 % None (Room air) 15 --    10/10/24 1955 -- -- -- -- -- -- -- 15 --    10/10/24 1950 -- 82 18 152/86 -- 100 % None (Room air) -- --    10/10/24 1940 -- 86 -- --  -- 100 % -- -- --    10/10/24 1930 -- 96 18 170/104 130 100 % None (Room air) -- --    10/10/24 1920 98.3 °F (36.8 °C) 101 18 176/108 137 100 % None (Room air) -- --              Pertinent Labs/Diagnostic Test Results:   Radiology:  MRI brain wo contrast   Final (10/11 1449)      No acute infarction, edema, or mass effect.         CTA stroke alert (head/neck)   Final I10/10 2004)      Unremarkable CTA neck and brain.         CT stroke alert brain   Final  (10/10 2001)      No acute intracranial abnormality.        Cardiology:  ECG 12 lead   Final (10/11 1027)   Normal sinus rhythm   Possible Left atrial enlargement   Borderline ECG           GI:  No orders to display           Results from last 7 days   Lab Units 10/11/24  0426 10/10/24  1932   WBC Thousand/uL 5.88 6.72   HEMOGLOBIN g/dL 14.6 16.0   HEMATOCRIT % 41.6 45.7   PLATELETS Thousands/uL 188 205   TOTAL NEUT ABS Thousands/µL 3.14 3.57         Results from last 7 days   Lab Units 10/11/24  0426 10/10/24  1932   SODIUM mmol/L 141 140   POTASSIUM mmol/L 3.7 3.2*   CHLORIDE mmol/L 108 105   CO2 mmol/L 27 29   ANION GAP mmol/L 6 6   BUN mg/dL 10 12   CREATININE mg/dL 0.94 0.98   EGFR ml/min/1.73sq m 108 103   CALCIUM mg/dL 9.0 9.7     Results from last 7 days   Lab Units 10/10/24  1932   AST U/L 25   ALT U/L 41   ALK PHOS U/L 71   TOTAL PROTEIN g/dL 7.7   ALBUMIN g/dL 4.9   TOTAL BILIRUBIN mg/dL 0.89     Results from last 7 days   Lab Units 10/10/24  1957   POC GLUCOSE mg/dl 61*     Results from last 7 days   Lab Units 10/11/24  0426 10/10/24  1932   GLUCOSE RANDOM mg/dL 91 99         Results from last 7 days   Lab Units 10/11/24  0426   HEMOGLOBIN A1C % 5.2   EAG mg/dl 103           Results from last 7 days   Lab Units 10/10/24  2001   HS TNI 0HR ng/L 3         Results from last 7 days   Lab Units 10/10/24  2001   PROTIME seconds 14.2   INR  1.08   PTT seconds 27         History reviewed. No pertinent past medical history.    Present on Admission:   TIA  (transient ischemic attack)   Hypokalemia      Admitting Diagnosis: Facial droop [R29.810]  Age/Sex: 28 y.o. male    Network Utilization Review Department  ATTENTION: Please call with any questions or concerns to [phone] and carefully listen to the prompts so that you are directed to the right person. All voicemails are confidential.   For Discharge needs, contact Care Management DC Support Team at [phone] opt. 2  Send all requests for admission clinical reviews, approved or denied determinations and any other requests to dedicated fax number below belonging to the campus where the patient is receiving treatment. List of dedicated fax numbers for the Facilities:  FACILITY NAME UR FAX NUMBER   ADMISSION DENIALS (Administrative/Medical Necessity) [phone]   DISCHARGE SUPPORT TEAM (NETWORK) [phone]   PARENT CHILD HEALTH (Maternity/NICU/Pediatrics) [phone]   [facility] [phone]   [facility] [phone]   [facility] [phone]   [facility] [phone]   [facility] [phone]   [facility] [phone]   [facility] [phone]   [facility] [phone]   [facility] [phone]   [facility] [phone]   [facility] [phone]   [facility] [phone]

## 2024-10-11 NOTE — ASSESSMENT & PLAN NOTE
Patient is a 28-year-old male without significant past medical history.  Presents to ED for evaluation of left facial numbness and paralysis, having some difficulty with speech and swallowing, unable to fully close left eye  Per neurology, not a TNK candidate due to nondisabling symptoms.    CT head showed no acute intracranial abnormality  CTA head and neck unremarkable  Hemoglobin A1c 5.2    MRI brain showed no acute infarction, edema or mass effect  Echo pending  Continue aspirin 81 mg daily and Lipitor 40 mg daily

## 2024-10-12 PROCEDURE — 99232 SBSQ HOSP IP/OBS MODERATE 35: CPT | Performed by: INTERNAL MEDICINE

## 2024-10-12 RX ADMIN — ASPIRIN 81 MG CHEWABLE TABLET 81 MG: 81 TABLET CHEWABLE at 09:04

## 2024-10-12 NOTE — UTILIZATION REVIEW
Initial Clinical Review    Observation  10/10 2033 Changed to INPATIENT  10/11 1955     Admission: Date/Time/Statement:   Admission Orders (From admission, onward)       Ordered        10/11/24 1955  INPATIENT ADMISSION  Once                          Orders Placed This Encounter   Procedures    INPATIENT ADMISSION     Standing Status:   Standing     Number of Occurrences:   1     Order Specific Question:   Level of Care     Answer:   Med Surg [16]     Order Specific Question:   Estimated length of stay     Answer:   More than 2 Midnights     Order Specific Question:   Certification     Answer:   I certify that inpatient services are medically necessary for this patient for a duration of greater than two midnights. See H&P and MD Progress Notes for additional information about the patient's course of treatment.     ED Arrival Information       Expected   -    Arrival   10/10/2024 19:13    Acuity   Emergent              Means of arrival   Walk-In    Escorted by   Self    Service   Hospitalist    Admission type   Emergency              Arrival complaint   Facial Droop             Chief Complaint   Patient presents with    Facial Numbness     Pt reports L facial numbness starting 2 hours pta. Pt reports is unable to move R side of face.        Initial Presentation: 28 y.o. male presents to ed from home on 10-10-24 for evaluation and treatment L facial numbness starting 2 hrs prior to admission. Stroke alert initiated. Found to have hypertension 176/108/ SBP improved to 140s in 1 hr without treatment. NIHSS 2. Imaging without acute finding. Not a thrombolytic candidate. Loaded ASA. K 3.2  Initially admitted as observation, transitioned to Inpatient 10/11 for continued > 2 MN stay due to TIA, pending echo  K repleted and is 3.7 on 10/11     Anticipated Length of Stay/Certification Statement: : The patient will continue to require additional inpatient hospital stay due to TIA, pending echo     Date: 10/12    Day 2:  Exam   alert, oriented x 3, no cranial nerve deficit, no sensory deficit, no motor weakness      Certification Statement: The patient will continue to require additional inpatient hospital stay due to TIA, penidng echo     ED Treatment-Medication Administration from 10/10/2024 1913 to 10/10/2024 2110         Date/Time Order Dose Route Action     10/10/2024 1948 iohexol (OMNIPAQUE) 350 MG/ML injection (MULTI-DOSE) 85 mL 85 mL Intravenous Given     10/10/2024 2010 aspirin chewable tablet 324 mg 324 mg Oral Given            Scheduled Medications:  aspirin, 81 mg, Oral, Daily  atorvastatin, 40 mg, Oral, Daily With Dinner      Continuous IV Infusions:     PRN Meds:  acetaminophen, 975 mg, Oral, Q6H PRN  aluminum-magnesium hydroxide-simethicone, 30 mL, Oral, Q6H PRN  ondansetron, 4 mg, Intravenous, Q6H PRN      ED Triage Vitals   Temperature Pulse Respirations Blood Pressure SpO2 Pain Score   10/10/24 1920 10/10/24 1920 10/10/24 1920 10/10/24 1920 10/10/24 1920 10/10/24 2115   98.3 °F (36.8 °C) 101 18 (!) 176/108 100 % No Pain     Weight (last 2 days)       Date/Time Weight    10/10/24 2115 77.2 (170.2)    10/10/24 1920 77.2 (170.2)            Vital Signs (last 3 days)       Date/Time Temp Pulse Resp BP MAP (mmHg) SpO2 O2 Device Patient Position - Orthostatic VS Ginette Coma Scale Score Pain    10/12/24 0759 98 °F (36.7 °C) 65 18 121/81 95 99 % None (Room air) Lying -- --    10/12/24 0710 -- -- -- -- -- -- -- -- -- No Pain    10/12/24 0259 97.3 °F (36.3 °C) 68 18 123/68 75 99 % None (Room air) Lying -- --    10/11/24 2223 97.8 °F (36.6 °C) 80 18 145/89 100 99 % None (Room air) Sitting -- --    10/11/24 2115 -- -- -- -- -- -- -- -- 15 No Pain    10/11/24 1916 97.3 °F (36.3 °C) 65 16 150/83 99 99 % None (Room air) Lying -- --    10/11/24 1500 98 °F (36.7 °C) 66 18 128/76 -- 99 % None (Room air) Lying -- --    10/11/24 1129 97.5 °F (36.4 °C) 67 16 129/78 98 99 % None (Room air) Lying -- --    10/11/24 0815 97.6 °F (36.4 °C) 68 16  138/78 98 98 % None (Room air) Lying 15 --    10/11/24 0715 -- -- -- -- -- -- -- -- -- No Pain    10/11/24 0615 -- -- -- -- -- -- -- -- 15 --    10/11/24 0613 98.1 °F (36.7 °C) 67 18 135/73 98 99 % None (Room air) Lying -- --    10/11/24 0415 98.6 °F (37 °C) 66 18 120/72 90 98 % None (Room air) Lying 15 --    10/11/24 0214 98.4 °F (36.9 °C) 65 18 126/70 90 98 % None (Room air) Lying 15 --    10/11/24 0015 98.2 °F (36.8 °C) 67 18 118/85 96 98 % None (Room air) Lying 15 --    10/10/24 2315 -- -- -- -- -- -- -- -- 15 --    10/10/24 2314 98.4 °F (36.9 °C) 58 18 147/77 107 99 % None (Room air) Lying -- --    10/10/24 2215 -- -- -- -- -- -- -- -- 15 --    10/10/24 2213 98.1 °F (36.7 °C) 67 18 134/83 101 98 % None (Room air) Lying -- --    10/10/24 2115 98.5 °F (36.9 °C) 63 18 129/86 103 98 % None (Room air) Sitting 15 No Pain    10/10/24 2055 -- 74 18 144/99 -- 98 % None (Room air) Lying -- --    10/10/24 20:25:40 98.4 °F (36.9 °C) 82 18 143/97 -- 98 % None (Room air) Lying 15 --    10/10/24 2010 98.5 °F (36.9 °C) 81 18 148/100 -- 98 % None (Room air) Lying 15 --    10/10/24 1955 -- -- -- -- -- -- -- -- 15 --    10/10/24 1950 -- 82 18 152/86 -- 100 % None (Room air) Lying -- --    10/10/24 1940 -- 86 -- -- -- 100 % -- -- -- --    10/10/24 1930 -- 96 18 170/104 130 100 % None (Room air) Lying -- --    10/10/24 1920 98.3 °F (36.8 °C) 101 18 176/108 137 100 % None (Room air) Lying -- --              Pertinent Labs/Diagnostic Test Results:   Radiology:  MRI brain wo contrast   Final Interpretation by Jamison Balderas MD (10/11 1449)      No acute infarction, edema, or mass effect.      Resident: CARROL KENNEDY I, the attending radiologist, have reviewed the images and agree with the final report above.      Workstation performed: [id number]         CTA stroke alert (head/neck)   Final Interpretation by Zafar Sandhu MD (10/10 2004)      Unremarkable CTA neck and brain.               Findings were directly  "discussed with Mikael Arenas at 8:03 p.m. 10/10/2024.      Workstation performed: [id number]         CT stroke alert brain   Final Interpretation by Zafar Sandhu MD (10/10 2001)      No acute intracranial abnormality.      Findings were directly discussed with Mikael Arenas at approximately 8:01 p.m. 10/10/2024.      Workstation performed: [id number]           Cardiology:  ECG 12 lead   Final Result by Bandar Puente MD (10/11 1027)   Normal sinus rhythm   Possible Left atrial enlargement   Borderline ECG      Confirmed by Bandar Puente ([id number]) on 10/11/2024 10:27:28 AM            Results from last 7 days   Lab Units 10/11/24  0426 10/10/24  1932   WBC Thousand/uL 5.88 6.72   HEMOGLOBIN g/dL 14.6 16.0   HEMATOCRIT % 41.6 45.7   PLATELETS Thousands/uL 188 205   TOTAL NEUT ABS Thousands/µL 3.14 3.57         Results from last 7 days   Lab Units 10/11/24  0426 10/10/24  1932   SODIUM mmol/L 141 140   POTASSIUM mmol/L 3.7 3.2*   CHLORIDE mmol/L 108 105   CO2 mmol/L 27 29   ANION GAP mmol/L 6 6   BUN mg/dL 10 12   CREATININE mg/dL 0.94 0.98   EGFR ml/min/1.73sq m 108 103   CALCIUM mg/dL 9.0 9.7     Results from last 7 days   Lab Units 10/10/24  1932   AST U/L 25   ALT U/L 41   ALK PHOS U/L 71   TOTAL PROTEIN g/dL 7.7   ALBUMIN g/dL 4.9   TOTAL BILIRUBIN mg/dL 0.89     Results from last 7 days   Lab Units 10/10/24  1957   POC GLUCOSE mg/dl 61*     Results from last 7 days   Lab Units 10/11/24  0426 10/10/24  1932   GLUCOSE RANDOM mg/dL 91 99         Results from last 7 days   Lab Units 10/11/24  0426   HEMOGLOBIN A1C % 5.2   EAG mg/dl 103     No results found for: \"BETA-HYDROXYBUTYRATE\"                   Results from last 7 days   Lab Units 10/10/24  2001   HS TNI 0HR ng/L 3         Results from last 7 days   Lab Units 10/10/24  2001   PROTIME seconds 14.2   INR  1.08   PTT seconds 27         History reviewed. No pertinent past medical history.  Present on Admission:   TIA (transient ischemic attack)   " Hypokalemia      Admitting Diagnosis: Facial droop [R29.810]  Age/Sex: 28 y.o. male    Network Utilization Review Department  ATTENTION: Please call with any questions or concerns to [phone] and carefully listen to the prompts so that you are directed to the right person. All voicemails are confidential.   For Discharge needs, contact Care Management DC Support Team at [phone] opt. 2  Send all requests for admission clinical reviews, approved or denied determinations and any other requests to dedicated fax number below belonging to the campus where the patient is receiving treatment. List of dedicated fax numbers for the Facilities:  FACILITY NAME UR FAX NUMBER   ADMISSION DENIALS (Administrative/Medical Necessity) [phone]   DISCHARGE SUPPORT TEAM (NETWORK) [phone]   PARENT CHILD HEALTH (Maternity/NICU/Pediatrics) [phone]   [facility] [phone]   [facility] [phone]   Count includes the [facility] [phone]   [facility] [phone]   [facility] [phone]   [facility] [phone]   [facility] [phone]   [facility] [phone]   [facility] [phone]   [facility] [phone]   [facility] [phone]   [facility] [phone]

## 2024-10-12 NOTE — PROGRESS NOTES
Progress Note - Hospitalist   Name: Maurisio Ramirez 28 y.o. male I MRN: [id number]  Unit/Bed#: E4 -01 I Date of Admission: 10/10/2024   Date of Service: 10/12/2024 I Hospital Day: 1    Assessment & Plan  TIA (transient ischemic attack)  Patient is a 28-year-old male without significant past medical history.  Presents to ED for evaluation of left facial numbness and paralysis, having some difficulty with speech and swallowing, unable to fully close left eye  Per neurology, not a TNK candidate due to nondisabling symptoms.    CT head showed no acute intracranial abnormality  CTA head and neck unremarkable  MRI brain showed no acute infarction, edema or mass effect  Hemoglobin A1c 5.2    Echo pending  Continue aspirin 81 mg daily and Lipitor 40 mg daily  All symptoms resolved 10/11  Hypokalemia  Replaced, resolved  Hyperlipidemia    Continue Lipitor 40 mg daily    VTE Pharmacologic Prophylaxis: VTE Score: 0 Low Risk (Score 0-2) - Encourage Ambulation.    Mobility:   Basic Mobility Inpatient Raw Score: 24  JH-HLM Goal: 8: Walk 250 feet or more  JH-HLM Achieved: 8: Walk 250 feet ot more  JH-HLM Goal achieved. Continue to encourage appropriate mobility.    Patient Centered Rounds:  discussed with nursing    Discussions with Specialists or Other Care Team Provider: neurology, nursing    Education and Discussions with Family / Patient: Updated  (friend and mother in law) at bedside.    Current Length of Stay: 1 day(s)  Current Patient Status: Inpatient   Certification Statement: The patient will continue to require additional inpatient hospital stay due to TIA, penidng echo  Discharge Plan: Anticipate discharge tomorrow to home.    Code Status: Level 1 - Full Code    Subjective   Patient was seen evaluated bedside, feeling well, denies chest pain palpitation shortness of breath, no weakness numbness, dysarthria, facial asymmetry    Objective :  Temp:  [97.3 °F (36.3 °C)-98 °F (36.7 °C)] 98 °F  (36.7 °C)  HR:  [65-80] 65  BP: (121-150)/(68-89) 121/81  Resp:  [16-18] 18  SpO2:  [99 %] 99 %  O2 Device: None (Room air)    There is no height or weight on file to calculate BMI.     Input and Output Summary (last 24 hours):     Intake/Output Summary (Last 24 hours) at 10/12/2024 1101  Last data filed at 10/12/2024 0900  Gross per 24 hour   Intake 760 ml   Output --   Net 760 ml       Physical Exam  Constitutional:       General: He is not in acute distress.  HENT:      Head: Atraumatic.   Cardiovascular:      Rate and Rhythm: Normal rate and regular rhythm.      Heart sounds: No murmur heard.  Pulmonary:      Effort: Pulmonary effort is normal. No respiratory distress.      Breath sounds: Normal breath sounds. No wheezing.   Abdominal:      General: Bowel sounds are normal. There is no distension.      Palpations: Abdomen is soft.      Tenderness: There is no abdominal tenderness.   Musculoskeletal:         General: No swelling.      Cervical back: Neck supple.   Skin:     General: Skin is warm and dry.   Neurological:      General: No focal deficit present.      Mental Status: He is alert and oriented to person, place, and time.      Cranial Nerves: No cranial nerve deficit.      Sensory: No sensory deficit.      Motor: No weakness.   Psychiatric:         Mood and Affect: Mood normal.           Lines/Drains:              Lab Results: I have reviewed the following results:   Results from last 7 days   Lab Units 10/11/24  0426   WBC Thousand/uL 5.88   HEMOGLOBIN g/dL 14.6   HEMATOCRIT % 41.6   PLATELETS Thousands/uL 188   SEGS PCT % 54   LYMPHO PCT % 30   MONO PCT % 12   EOS PCT % 4     Results from last 7 days   Lab Units 10/11/24  0426 10/10/24  1932   SODIUM mmol/L 141 140   POTASSIUM mmol/L 3.7 3.2*   CHLORIDE mmol/L 108 105   CO2 mmol/L 27 29   BUN mg/dL 10 12   CREATININE mg/dL 0.94 0.98   ANION GAP mmol/L 6 6   CALCIUM mg/dL 9.0 9.7   ALBUMIN g/dL  --  4.9   TOTAL BILIRUBIN mg/dL  --  0.89   ALK PHOS U/L   --  71   ALT U/L  --  41   AST U/L  --  25   GLUCOSE RANDOM mg/dL 91 99     Results from last 7 days   Lab Units 10/10/24  2001   INR  1.08     Results from last 7 days   Lab Units 10/10/24  1957   POC GLUCOSE mg/dl 61*     Results from last 7 days   Lab Units 10/11/24  0426   HEMOGLOBIN A1C % 5.2           Recent Cultures (last 7 days):         Imaging Results Review: I reviewed radiology reports from this admission including: MRI brain.  Other Study Results Review: EKG was reviewed.     Last 24 Hours Medication List:     Current Facility-Administered Medications:     acetaminophen (TYLENOL) tablet 975 mg, Q6H PRN    aluminum-magnesium hydroxide-simethicone (MAALOX) oral suspension 30 mL, Q6H PRN    aspirin chewable tablet 81 mg, Daily    atorvastatin (LIPITOR) tablet 40 mg, Daily With Dinner    ondansetron (ZOFRAN) injection 4 mg, Q6H PRN    Administrative Statements   Today, Patient Was Seen By: Jana Valencia MD      **Please Note: This note may have been constructed using a voice recognition system.**

## 2024-10-12 NOTE — ASSESSMENT & PLAN NOTE
Patient is a 28-year-old male without significant past medical history.  Presents to ED for evaluation of left facial numbness and paralysis, having some difficulty with speech and swallowing, unable to fully close left eye  Per neurology, not a TNK candidate due to nondisabling symptoms.    CT head showed no acute intracranial abnormality  CTA head and neck unremarkable  MRI brain showed no acute infarction, edema or mass effect  Hemoglobin A1c 5.2    Echo pending  Continue aspirin 81 mg daily and Lipitor 40 mg daily  All symptoms resolved 10/11

## 2024-10-13 ENCOUNTER — APPOINTMENT (INPATIENT)
Dept: NON INVASIVE DIAGNOSTICS | Facility: HOSPITAL | Age: 29
DRG: 047 | End: 2024-10-13
Payer: COMMERCIAL

## 2024-10-13 VITALS
BODY MASS INDEX: 25.76 KG/M2 | DIASTOLIC BLOOD PRESSURE: 73 MMHG | RESPIRATION RATE: 18 BRPM | TEMPERATURE: 98.4 F | WEIGHT: 170 LBS | SYSTOLIC BLOOD PRESSURE: 125 MMHG | HEIGHT: 68 IN | OXYGEN SATURATION: 99 % | HEART RATE: 69 BPM

## 2024-10-13 LAB
AORTIC ROOT: 3.4 CM
AORTIC VALVE MEAN VELOCITY: 7.7 M/S
APICAL FOUR CHAMBER EJECTION FRACTION: 59 %
ASCENDING AORTA: 3 CM
AV AREA BY CONTINUOUS VTI: 3.3 CM2
AV AREA PEAK VELOCITY: 3.3 CM2
AV LVOT MEAN GRADIENT: 2 MMHG
AV LVOT PEAK GRADIENT: 4 MMHG
AV MEAN GRADIENT: 3 MMHG
AV PEAK GRADIENT: 5 MMHG
AV VALVE AREA: 3.26 CM2
AV VELOCITY RATIO: 0.86
BSA FOR ECHO PROCEDURE: 1.91 M2
DOP CALC AO PEAK VEL: 1.15 M/S
DOP CALC AO VTI: 21.88 CM
DOP CALC LVOT AREA: 3.8 CM2
DOP CALC LVOT CARDIAC INDEX: 2.9 L/MIN/M2
DOP CALC LVOT CARDIAC OUTPUT: 5.25 L/MIN
DOP CALC LVOT DIAMETER: 2.2 CM
DOP CALC LVOT PEAK VEL VTI: 18.75 CM
DOP CALC LVOT PEAK VEL: 0.99 M/S
DOP CALC LVOT STROKE VOLUME: 71.24
E WAVE DECELERATION TIME: 175 MS
E/A RATIO: 0.88
FRACTIONAL SHORTENING: 45 (ref 28–44)
INTERVENTRICULAR SEPTUM IN DIASTOLE (PARASTERNAL SHORT AXIS VIEW): 0.9 CM
INTERVENTRICULAR SEPTUM: 0.9 CM (ref 0.6–1.1)
LAAS-AP2: 20.1 CM2
LAAS-AP4: 17.3 CM2
LEFT ATRIUM SIZE: 3.6 CM
LEFT ATRIUM VOLUME (MOD BIPLANE): 55 ML
LEFT INTERNAL DIMENSION IN SYSTOLE: 2.9 CM (ref 2.1–4)
LEFT VENTRICULAR INTERNAL DIMENSION IN DIASTOLE: 5.3 CM (ref 3.5–6)
LEFT VENTRICULAR POSTERIOR WALL IN END DIASTOLE: 0.9 CM
LEFT VENTRICULAR STROKE VOLUME: 105 ML
LVSV (TEICH): 105 ML
MV E'TISSUE VEL-LAT: 13 CM/S
MV E'TISSUE VEL-SEP: 8 CM/S
MV PEAK A VEL: 0.69 M/S
MV PEAK E VEL: 61 CM/S
MV STENOSIS PRESSURE HALF TIME: 45 MS
MV VALVE AREA P 1/2 METHOD: 4.89
RIGHT ATRIUM AREA SYSTOLE A4C: 13.2 CM2
RIGHT VENTRICLE ID DIMENSION: 3.6 CM
SINOTUBULAR JUNCTION: 2.8 CM
SL CV LEFT ATRIUM LENGTH A2C: 5.2 CM
SL CV LV EF: 59
SL CV PED ECHO LEFT VENTRICLE DIASTOLIC VOLUME (MOD BIPLANE) 2D: 138 ML
SL CV PED ECHO LEFT VENTRICLE SYSTOLIC VOLUME (MOD BIPLANE) 2D: 33 ML
SL CV SINUS OF VALSALVA 2D: 3.6 CM
STJ: 2.8 CM
TRICUSPID ANNULAR PLANE SYSTOLIC EXCURSION: 2.5 CM

## 2024-10-13 PROCEDURE — 99239 HOSP IP/OBS DSCHRG MGMT >30: CPT | Performed by: INTERNAL MEDICINE

## 2024-10-13 PROCEDURE — 93306 TTE W/DOPPLER COMPLETE: CPT

## 2024-10-13 PROCEDURE — 93306 TTE W/DOPPLER COMPLETE: CPT | Performed by: STUDENT IN AN ORGANIZED HEALTH CARE EDUCATION/TRAINING PROGRAM

## 2024-10-13 RX ORDER — ATORVASTATIN CALCIUM 40 MG/1
40 TABLET, FILM COATED ORAL
Qty: 30 TABLET | Refills: 0 | Status: SHIPPED | OUTPATIENT
Start: 2024-10-13 | End: 2024-11-12

## 2024-10-13 RX ORDER — ASPIRIN 81 MG/1
81 TABLET, CHEWABLE ORAL DAILY
Qty: 30 TABLET | Refills: 0 | Status: SHIPPED | OUTPATIENT
Start: 2024-10-14 | End: 2024-11-13

## 2024-10-13 RX ADMIN — ASPIRIN 81 MG CHEWABLE TABLET 81 MG: 81 TABLET CHEWABLE at 08:32

## 2024-10-13 NOTE — DISCHARGE SUMMARY
Discharge Summary - Hospitalist   Name: Maurisio Ramirez 28 y.o. male I MRN: [id number]  Unit/Bed#: E4 -01 I Date of Admission: 10/10/2024   Date of Service: 10/13/2024 I Hospital Day: 2     Assessment & Plan  TIA (transient ischemic attack)  Patient is a 28-year-old male without significant past medical history.  Presents to ED for evaluation of left facial numbness and paralysis, having some difficulty with speech and swallowing, unable to fully close left eye  Per neurology, not a TNK candidate due to nondisabling symptoms.    CT head showed no acute intracranial abnormality  CTA head and neck unremarkable  MRI brain showed no acute infarction, edema or mass effect  Hemoglobin A1c 5.2,   All symptoms resolved the day after admission  Continue aspirin 81 mg daily and Lipitor 40 mg daily  Echo showed ejection fraction of 59%, normal diastolic function, appears to be a patent foramen ovale or atrial septal defect  Discussed with cardiology ambulatory referral to discuss BONNIE  Hypokalemia  Replaced, resolved  Hyperlipidemia    Continue Lipitor 40 mg daily     Medical Problems       Resolved Problems  Date Reviewed: 10/12/2024   None       Discharging Physician / Practitioner: Jana Valencia MD  PCP: No primary care provider on file.  Admission Date:   Admission Orders (From admission, onward)       Ordered        10/11/24 1955  INPATIENT ADMISSION  Once            10/10/24 2033  Place in Observation  Once                          Discharge Date: 10/13/24    Consultations During Hospital Stay:  Neurology    Procedures Performed:   None    Significant Findings / Test Results:   CT head 10/10/2024  IMPRESSION:  No acute intracranial abnormality.     CTA head and neck 10/10/2024  IMPRESSION:  Unremarkable CTA neck and brain.    MRI of the brain without contrast 10/11/2024  IMPRESSION:  No acute infarction, edema, or mass effect.    Echo 10/12/2024  Interpretation Summary  Show Result Comparison      Left Ventricle: Left ventricular cavity size is normal. Wall thickness is normal. The left ventricular ejection fraction is 59%. Systolic function is normal. Wall motion is normal. Diastolic function is normal.    Right Ventricle: Right ventricular cavity size is normal. Systolic function is normal.    Atrial Septum: There appears to be a patent foramen ovale or atrial septal defect noted at rest with predominant left to right shunting using color Doppler.  No bubble study was performed.    Incidental Findings:   none       Test Results Pending at Discharge (will require follow up):   none     Outpatient Tests Requested:  none    Complications:  none    Reason for Admission: TIA    Hospital Course:   Maurisio Ramirez is a 28 y.o. male patient who originally presented to the hospital on 10/10/2024 due to left facial numbness, difficulty with speech and swallowing, unable to fully close his left eye.  Per neurology not a TNK candidate due to nondisabling symptoms.  CT head and CTA head and neck unremarkable.  MRI of the brain negative for acute infarct edema or mass effect.  All symptoms resolved the day after admission.  Echo showed ejection fraction 59%, normal diastolic function, appears to be a patent foramina ovale or atrial septal defect.  Ambulatory referral to cardiology to discuss BONNIE.  Will be discharged with aspirin 81 mg daily and Lipitor 40 mg daily.  Patient does not have a PCP, appointment was scheduled with [place] on 10/30/2024.      Please see above list of diagnoses and related plan for additional information.     Condition at Discharge: good    Discharge Day Visit / Exam:   Subjective: Patient was seen and evaluated bedside, denies chest pain palpitation shortness of breath, denies facial numbness or weakness  Vitals: Blood Pressure: 125/73 (10/13/24 1045)  Pulse: 69 (10/13/24 0722)  Temperature: 98.4 °F (36.9 °C) (10/13/24 1045)  Temp Source: Temporal (10/13/24 0722)  Respirations: 18 (10/13/24  "0722)  Height: 5' 8\" (172.7 cm) (10/13/24 1045)  Weight - Scale: 77.1 kg (170 lb) (10/13/24 1045)  SpO2: 99 % (10/13/24 0722)  Physical Exam  Constitutional:       General: He is not in acute distress.  HENT:      Head: Atraumatic.   Cardiovascular:      Rate and Rhythm: Normal rate and regular rhythm.      Heart sounds: No murmur heard.  Pulmonary:      Effort: Pulmonary effort is normal. No respiratory distress.      Breath sounds: Normal breath sounds. No wheezing.   Abdominal:      General: Bowel sounds are normal. There is no distension.      Palpations: Abdomen is soft.      Tenderness: There is no abdominal tenderness.   Musculoskeletal:         General: No swelling.      Cervical back: Neck supple.   Skin:     General: Skin is warm and dry.   Neurological:      General: No focal deficit present.      Mental Status: He is alert and oriented to person, place, and time.      Cranial Nerves: No cranial nerve deficit.      Sensory: No sensory deficit.      Motor: No weakness.   Psychiatric:         Mood and Affect: Mood normal.          Discussion with Family: Updated  (significant other) at bedside.    Discharge instructions/Information to patient and family:   See after visit summary for information provided to patient and family.      Provisions for Follow-Up Care:  See after visit summary for information related to follow-up care and any pertinent home health orders.      Mobility at time of Discharge:   Basic Mobility Inpatient Raw Score: 24  JH-HLM Goal: 8: Walk 250 feet or more  JH-HLM Achieved: 8: Walk 250 feet ot more  HLM Goal achieved. Continue to encourage appropriate mobility.     Disposition:   Home    Planned Readmission: no    Discharge Medications:  See after visit summary for reconciled discharge medications provided to patient and/or family.      Administrative Statements   Discharge Statement:  I have spent a total time of 40 minutes in caring for this patient on the day of the " visit/encounter. >30 minutes of time was spent on: Counseling / Coordination of care, Documenting in the medical record, Reviewing / ordering tests, medicine, procedures  , and Communicating with other healthcare professionals .    **Please Note: This note may have been constructed using a voice recognition system**

## 2024-10-13 NOTE — ASSESSMENT & PLAN NOTE
Patient is a 28-year-old male without significant past medical history.  Presents to ED for evaluation of left facial numbness and paralysis, having some difficulty with speech and swallowing, unable to fully close left eye  Per neurology, not a TNK candidate due to nondisabling symptoms.    CT head showed no acute intracranial abnormality  CTA head and neck unremarkable  MRI brain showed no acute infarction, edema or mass effect  Hemoglobin A1c 5.2,   All symptoms resolved the day after admission  Continue aspirin 81 mg daily and Lipitor 40 mg daily  Echo showed ejection fraction of 59%, normal diastolic function, appears to be a patent foramen ovale or atrial septal defect  Discussed with cardiology ambulatory referral to discuss BONNIE

## 2024-10-13 NOTE — DISCHARGE INSTR - AVS FIRST PAGE
You presented to the hospital with left-sided facial numbness and facial asymmetry secondary to a transient ischemic attack.  CT head and CTA head and neck were unremarkable.  MRI of the brain showed no acute infarction.  You were seen by neurology, on discharge you will be on aspirin 81 mg daily and Lipitor 40 mg daily.  Your heart ultrasound showed a possible atrial septal defect or patent foramen ovale.  Referral was placed to cardiology, please follow-up with them to discuss further imaging to confirm the findings.    Follow-up with primary care doctor and neurology.  Phone numbers are in the chart.

## 2024-10-14 ENCOUNTER — TELEPHONE (OUTPATIENT)
Age: 29
End: 2024-10-14

## 2024-10-14 NOTE — TELEPHONE ENCOUNTER
SCHEDULED WITH REYES BURDICK, 1/28/2024, 9AM. PLACED ON WAITING LIST       1ST ATTEMPT,     Called pt no answer, LMOM.    Thank you,     Annmarie CHAMBERS/ JACOB ALL/ TIA    DC- HOME- 10/13/2024    ----- Message from Yamilet Kebede PA-C sent at 10/11/2024  2:45 PM EDT -----  Maurisio Ramirez will need follow-up in in 6 weeks with neurovascular attending for TIA in 60 minute appointment. They will not require outpatient neurological testing.

## 2024-10-30 ENCOUNTER — OFFICE VISIT (OUTPATIENT)
Dept: FAMILY MEDICINE CLINIC | Facility: CLINIC | Age: 29
End: 2024-10-30

## 2024-10-30 VITALS
HEIGHT: 68 IN | DIASTOLIC BLOOD PRESSURE: 92 MMHG | TEMPERATURE: 97.8 F | HEART RATE: 86 BPM | RESPIRATION RATE: 16 BRPM | OXYGEN SATURATION: 98 % | SYSTOLIC BLOOD PRESSURE: 136 MMHG | WEIGHT: 172.6 LBS | BODY MASS INDEX: 26.16 KG/M2

## 2024-10-30 DIAGNOSIS — E87.6 HYPOKALEMIA: ICD-10-CM

## 2024-10-30 DIAGNOSIS — G45.9 TIA (TRANSIENT ISCHEMIC ATTACK): Primary | ICD-10-CM

## 2024-10-30 PROCEDURE — 99203 OFFICE O/P NEW LOW 30 MIN: CPT | Performed by: FAMILY MEDICINE

## 2024-10-30 RX ORDER — ASPIRIN 81 MG/1
81 TABLET, CHEWABLE ORAL DAILY
Qty: 30 TABLET | Refills: 3 | Status: SHIPPED | OUTPATIENT
Start: 2024-10-30 | End: 2025-02-27

## 2024-10-30 RX ORDER — ATORVASTATIN CALCIUM 40 MG/1
40 TABLET, FILM COATED ORAL
Qty: 30 TABLET | Refills: 3 | Status: SHIPPED | OUTPATIENT
Start: 2024-10-30 | End: 2025-02-27

## 2024-10-30 NOTE — PROGRESS NOTES
Ambulatory Visit  Name: Maurisio Ramirez      : 1995      MRN: [id number]  Encounter Provider: Yogi Johnson MD  Encounter Date: 10/30/2024   Encounter department: [facility]    Assessment & Plan  TIA (transient ischemic attack)  Possibly due to PFO as seen on ECHO.   CT/MRI head ruled out acute infarction, edema, or mass effect.  No residual deficits. Neurological exam wnl. Denies dyspnea on exertion, chest pain. Exercises regularly.  Adherent with aspirin 81 mg daily and atorvastatin 40 mg daily.  Continue with cardiology follow up in December and neurology follow up in 2025.  Follow up with PCP in February for annual physical.  Refills for medications provided.    Orders:    aspirin 81 mg chewable tablet; Chew 1 tablet (81 mg total) daily    atorvastatin (LIPITOR) 40 mg tablet; Take 1 tablet (40 mg total) by mouth daily with dinner    Hypokalemia  Resolved.           History of Present Illness     Maurisio Ramirez is a very pleasant 28 y.o. male with no significant pmh and presents today for follow up of TIA. Patient was admitted on 10/10 and discharged on 10/13. Patient was with left facial numbness and facial droop for one hour prior to arrival. Per neurology consult he was not a candidate for TNK due to nondisabling sxs. Hospital work up showed PFO and CT/MRI head negative for acute ischemia, edema, or mass. Patient was started on aspirin 81 mg and atorvastatin 40 mg. Discharge instructions to follow up with cardiology to discuss BONNIE and neurology.  Today patient denies any facial weakness or numbness. No other focal deficits or weakness in bilateral upper or lower extremities. He has been adherent with ASA and atorvastatin. He is able to report the dates of his cardiology and neurology follow up visits.             Review of Systems   Constitutional:  Negative for chills and fatigue.   HENT:  Negative for sore throat and trouble swallowing.    Eyes:  Negative  "for visual disturbance.   Respiratory:  Negative for cough, chest tightness and shortness of breath.    Cardiovascular:  Negative for chest pain and palpitations.   Gastrointestinal:  Negative for abdominal pain, blood in stool, constipation, diarrhea, nausea and vomiting.   Genitourinary:  Negative for dysuria and hematuria.   Musculoskeletal:  Negative for arthralgias, joint swelling and myalgias.   Neurological:  Negative for dizziness, weakness, light-headedness and headaches.   Psychiatric/Behavioral:  Negative for agitation, behavioral problems, confusion and decreased concentration.            Objective     /92 (BP Location: Right arm, Patient Position: Sitting, Cuff Size: Standard)   Pulse 86   Temp 97.8 °F (36.6 °C) (Temporal)   Resp 16   Ht 5' 8\" (1.727 m)   Wt 78.3 kg (172 lb 9.6 oz)   SpO2 98%   BMI 26.24 kg/m²     Physical Exam  Constitutional:       General: He is not in acute distress.     Appearance: Normal appearance.   HENT:      Head: Normocephalic and atraumatic.      Nose: Nose normal.      Mouth/Throat:      Mouth: Mucous membranes are moist.      Pharynx: Oropharynx is clear.   Eyes:      Extraocular Movements: Extraocular movements intact.      Pupils: Pupils are equal, round, and reactive to light.   Cardiovascular:      Rate and Rhythm: Normal rate and regular rhythm.      Pulses: Normal pulses.           Dorsalis pedis pulses are 2+ on the right side and 2+ on the left side.        Posterior tibial pulses are 2+ on the right side and 2+ on the left side.      Heart sounds: Normal heart sounds.   Pulmonary:      Effort: Pulmonary effort is normal. No respiratory distress.      Breath sounds: Normal breath sounds.   Chest:      Chest wall: No tenderness.   Abdominal:      General: Bowel sounds are normal. There is no distension.      Palpations: Abdomen is soft.      Tenderness: There is no abdominal tenderness.   Musculoskeletal:         General: Normal range of motion.      " Cervical back: Normal range of motion. No rigidity.      Right lower leg: No edema.      Left lower leg: No edema.   Feet:      Right foot:      Skin integrity: No ulcer, skin breakdown, erythema, warmth, callus or dry skin.      Left foot:      Skin integrity: No ulcer, skin breakdown, erythema, warmth, callus or dry skin.   Skin:     General: Skin is warm.      Capillary Refill: Capillary refill takes less than 2 seconds.   Neurological:      General: No focal deficit present.      Mental Status: He is alert and oriented to person, place, and time. Mental status is at baseline.      Cranial Nerves: Cranial nerves 2-12 are intact.      Sensory: Sensation is intact.      Motor: Motor function is intact. No weakness.      Coordination: Coordination is intact. Coordination normal.      Gait: Gait is intact. Gait normal.      Deep Tendon Reflexes: Reflexes normal.   Psychiatric:         Mood and Affect: Mood normal.         Behavior: Behavior normal.

## 2024-10-30 NOTE — ASSESSMENT & PLAN NOTE
Possibly due to PFO as seen on ECHO.   CT/MRI head ruled out acute infarction, edema, or mass effect.  No residual deficits. Neurological exam wnl. Denies dyspnea on exertion, chest pain. Exercises regularly.  Adherent with aspirin 81 mg daily and atorvastatin 40 mg daily.  Continue with cardiology follow up in December and neurology follow up in January 2025.  Follow up with PCP in February for annual physical.  Refills for medications provided.    Orders:    aspirin 81 mg chewable tablet; Chew 1 tablet (81 mg total) daily    atorvastatin (LIPITOR) 40 mg tablet; Take 1 tablet (40 mg total) by mouth daily with dinner

## 2024-12-12 ENCOUNTER — CONSULT (OUTPATIENT)
Dept: CARDIOLOGY CLINIC | Facility: CLINIC | Age: 29
End: 2024-12-12

## 2024-12-12 VITALS
SYSTOLIC BLOOD PRESSURE: 144 MMHG | BODY MASS INDEX: 26 KG/M2 | WEIGHT: 171 LBS | DIASTOLIC BLOOD PRESSURE: 100 MMHG | HEART RATE: 65 BPM

## 2024-12-12 DIAGNOSIS — I25.3 INTERATRIAL SEPTAL ANEURYSM WITH PFO: Primary | ICD-10-CM

## 2024-12-12 DIAGNOSIS — R03.0 ELEVATED BLOOD PRESSURE READING IN OFFICE WITHOUT DIAGNOSIS OF HYPERTENSION: ICD-10-CM

## 2024-12-12 DIAGNOSIS — E78.2 MIXED HYPERLIPIDEMIA: ICD-10-CM

## 2024-12-12 DIAGNOSIS — Q21.12 INTERATRIAL SEPTAL ANEURYSM WITH PFO: Primary | ICD-10-CM

## 2024-12-12 DIAGNOSIS — Z86.73 HISTORY OF TIA (TRANSIENT ISCHEMIC ATTACK): ICD-10-CM

## 2024-12-12 PROCEDURE — 93000 ELECTROCARDIOGRAM COMPLETE: CPT | Performed by: INTERNAL MEDICINE

## 2024-12-12 PROCEDURE — 99244 OFF/OP CNSLTJ NEW/EST MOD 40: CPT | Performed by: INTERNAL MEDICINE

## 2024-12-12 NOTE — ASSESSMENT & PLAN NOTE
He is on moderate intensity statin therapy.  He should have a follow-up lipid profile and comprehensive metabolic panel within next 3 months.  Can be done before next follow-up.  Orders:    COMPREHENSIVE METABOLIC PANEL(12); Future    Lipid Panel With Direct LDL; Future

## 2024-12-12 NOTE — ASSESSMENT & PLAN NOTE
Patient presented to emergency room with strokelike symptoms.  Ruled out for acute CVA and evaluation was suggestive of TIA.  He does not have interatrial septal aneurysm.  He gives no history of migraine headaches.  He gives no history of recurrence of strokelike symptoms since the hospitalization.  Physical examination today is overall unremarkable.  However blood pressure is noted to be elevated.  Patient says that his blood pressure is elevated because he is very anxious about the visit.  Denies any headaches or visual symptoms.   at this time he does not have medical insurance.  He says that we will have insurance beginning January 1, 2024.    -- Advised him to record random blood pressures at home provided blood pressure tracking sheet.  Our goal is to bring his systolic blood pressures to less than or at 130 mmHg systolic or 80 mmHg or less diastolic.  Advised him to record random blood pressures and bring the readings for us to review when he has about 15-20 of them.  -- He will need a 30-day event monitoring however I am not ordering this for now because he does not have insurance.  He is advised to call us after January 1 when he has insurance and this testing will be ordered.  Advised him to check if he qualifies for any medical assistance.  -- He may repeat limited echocardiogram with bubble study with agitated saline to further evaluate his suspected PFO.  If he has significant PFO then will consider referral to structural heart disease specialist for consideration for PFO closure.    Orders:    Ambulatory Referral to Cardiology    POCT ECG

## 2024-12-12 NOTE — PROGRESS NOTES
Name: Maurisio Ramirez      : 1995      MRN: [id number]  Encounter Provider: Belinda Alex MD  Encounter Date: 2024   Encounter department: [facility] [place]  Assessment & Plan  History of TIA (transient ischemic attack)  Patient presented to emergency room with strokelike symptoms.  Ruled out for acute CVA and evaluation was suggestive of TIA.  He does not have interatrial septal aneurysm.  He gives no history of migraine headaches.  He gives no history of recurrence of strokelike symptoms since the hospitalization.  Physical examination today is overall unremarkable.  However blood pressure is noted to be elevated.  Patient says that his blood pressure is elevated because he is very anxious about the visit.  Denies any headaches or visual symptoms.   at this time he does not have medical insurance.  He says that we will have insurance beginning 2024.    -- Advised him to record random blood pressures at home provided blood pressure tracking sheet.  Our goal is to bring his systolic blood pressures to less than or at 130 mmHg systolic or 80 mmHg or less diastolic.  Advised him to record random blood pressures and bring the readings for us to review when he has about 15-20 of them.  -- He will need a 30-day event monitoring however I am not ordering this for now because he does not have insurance.  He is advised to call us after  when he has insurance and this testing will be ordered.  Advised him to check if he qualifies for any medical assistance.  -- He may repeat limited echocardiogram with bubble study with agitated saline to further evaluate his suspected PFO.  If he has significant PFO then will consider referral to structural heart disease specialist for consideration for PFO closure.    Orders:    Ambulatory Referral to Cardiology    POCT ECG    Interatrial septal aneurysm with PFO  Evaluation and plan as outlined above.       Elevated blood pressure reading  in office without diagnosis of hypertension  Evaluation and plan as outlined above.       Mixed hyperlipidemia  He is on moderate intensity statin therapy.  He should have a follow-up lipid profile and comprehensive metabolic panel within next 3 months.  Can be done before next follow-up.  Orders:    COMPREHENSIVE METABOLIC PANEL(12); Future    Lipid Panel With Direct LDL; Future        History of Present Illness   HPI  Maurisio Ramirez is a 29 y.o. male of [place] origin who presents for eval recent diagnosis of paroxysmal ischemic attack.  He is here accompanied with his boyfriend and her female friend.  He was hospitalized at [facility] between 10/10/2024 and 10/13/2024.  Prior to hospitalization he had no significant medical history.  He had presented to emergency room with complaint of feeling of numbness and weakness on the left side of the face along with facial droop.  He first noted the symptoms when he was coming out of the shower.  He initially went to bed when he noticed the symptom but he when he woke up he still was experiencing the symptoms so decided to come to the emergency room.  In ER initial blood pressure was 176/108.  Stat head CT revealed no acute intracranial abnormality.  Stroke alert was called patient was not a thrombolytic candidate due to low NIH SS/nondisabling symptoms.  He was loaded with aspirin and admitted under stroke pathway.  He was seen by neurologist.  His neurologic examination in the emergency room did not identify any focal deficit.  He was diagnosed with a TIA event.  He underwent CTA and MRI of the brain which did not identify significant cerebrovascular or carotid artery disease or any intracranial abnormality.  His echocardiogram was significant for normal left ventricular size and systolic function EF 59% normal diastolic function.  He was noted to have interatrial septum aneurysm with evidence of left-to-right shunting with color flow Doppler.   Bubble study was not performed.  There was trace mitral and tricuspid and pulmonic valve regurgitation.  He was eventually discharged home with instructions to establish follow-up with cardiology.    Since the hospitalization he denies experiencing recurrence of symptoms.    He denies prior history of syncope or seizures.  He denies any recent unusual chest pain or shortness of breath or dizziness or palpitations or passing out or near passing out.  He denies any orthopnea or PND or worsening pedal edema.  He reports being active.  He works as a .  He denies experiencing any decline in exercise tolerance recently.     his family history significant for stroke in his mother at age around 60 years.  There is no family history of atrial fibrillation or other arrhythmias or sudden cardiac death or premature CAD.    He reports he has never been a smoker.  He reports drinking alcohol occasionally.  He denies any marijuana use or any illicit drug use.    His blood work from hospitalization is reviewed.  Hemoglobin and hematocrit are normal.  Sodium was 141 potassium 3.7418 bicarb 27 BUN 10 creatinine 0.94 GFR 91  Normal liver function test  Total cholesterol 168 triglyceride 144 HDL 31 calculated   CBC was unremarkable.  Hemoglobin A1c was 5.1  He has had no thyroid function test.  HIV screening test in 2021 were normal.    Current cardiac medications include aspirin 81 mg daily, atorvastatin 40 daily.    History obtained from: patient    ECG:   Results for orders placed or performed in visit on 12/12/24   POCT ECG    Narrative    Sinus rhythm, HR 65 bpm, normal axis and intervals, no significant chamber hypertrophy enlargement.  No changes of ischemia, no evidence of infarction.       REVIEW OF SYSTEMS   Positive for: As noted above in HPI  Negative for: All remaining as reviewed below and in HPI.    SYSTEM SYMPTOMS REVIEWED:  General--weight change, fever, night sweats  Respiratory--cough, wheezing,  shortness of breath, sputum production  Cardiovascular--chest pain, syncope, dyspnea on exertion, edema, decline in exercise tolerance, claudication   Gastrointestinal--persistent vomiting, diarrhea, abdominal distention, blood in stool   Muscular or skeletal--joint pain or swelling   Neurologic--headaches, syncope, abnormal movement  Hematologic--history of easy bruising and bleeding   Endocrine--thyroid enlargement, heat or cold intolerance, polyuria   Psychiatric--anxiety, depression     CURRENT MEDICATIONS LIST     Current Outpatient Medications:     aspirin 81 mg chewable tablet, Chew 1 tablet (81 mg total) daily, Disp: 30 tablet, Rfl: 3    atorvastatin (LIPITOR) 40 mg tablet, Take 1 tablet (40 mg total) by mouth daily with dinner, Disp: 30 tablet, Rfl: 3       ALLERGIES   No Known Allergies    *-*-*-*-*-*-*-*-*-*-*-*-*-*-*-*-*-*-*-*-*-*-*-*-*-*-*-*-*-*-*-*-*-*-*-*-*-*-*-*-*-*-*-*-*-*-*-*-*-*-*-*-*-*-    Medical History Reviewed by provider this encounter:  Problems     .     Objective   /100 (BP Location: Right arm, Patient Position: Sitting, Cuff Size: Adult)   Pulse 65   Wt 77.6 kg (171 lb)   BMI 26.00 kg/m²      Physical Exam  Constitutional:       Appearance: He is normal weight.   HENT:      Head: Normocephalic.   Neck:      Vascular: No carotid bruit.   Cardiovascular:      Rate and Rhythm: Normal rate and regular rhythm.      Pulses: Normal pulses.      Heart sounds: Normal heart sounds.   Pulmonary:      Effort: Pulmonary effort is normal.      Breath sounds: Normal breath sounds. No wheezing or rales.   Abdominal:      General: Abdomen is flat.   Musculoskeletal:      Right lower leg: No edema.      Left lower leg: No edema.   Skin:     General: Skin is warm and dry.   Neurological:      General: No focal deficit present.      Mental Status: He is oriented to person, place, and time. Mental status is at baseline.

## 2024-12-12 NOTE — PATIENT INSTRUCTIONS
Assessment & Plan  History of TIA (transient ischemic attack)  Patient presented to emergency room with strokelike symptoms.  Ruled out for acute CVA and evaluation was suggestive of TIA.  He does not have interatrial septal aneurysm.  He gives no history of migraine headaches.  He gives no history of recurrence of strokelike symptoms since the hospitalization.  Physical examination today is overall unremarkable.  However blood pressure is noted to be elevated.  Patient says that his blood pressure is elevated because he is very anxious about the visit.  Denies any headaches or visual symptoms.   at this time he does not have medical insurance.  He says that we will have insurance beginning January 1, 2024.    -- Advised him to record random blood pressures at home provided blood pressure tracking sheet.  Our goal is to bring his systolic blood pressures to less than or at 130 mmHg systolic or 80 mmHg or less diastolic.  Advised him to record random blood pressures and bring the readings for us to review when he has about 15-20 of them.  -- He will need a 30-day event monitoring however I am not ordering this for now because he does not have insurance.  He is advised to call us after January 1 when he has insurance and this testing will be ordered.  Advised him to check if he qualifies for any medical assistance.  -- He may repeat limited echocardiogram with bubble study with agitated saline to further evaluate his suspected PFO.  If he has significant PFO then will consider referral to structural heart disease specialist for consideration for PFO closure.    Orders:    Ambulatory Referral to Cardiology    POCT ECG    Interatrial septal aneurysm with PFO  Evaluation and plan as outlined above.       Elevated blood pressure reading in office without diagnosis of hypertension  Evaluation and plan as outlined above.       Mixed hyperlipidemia  He is on moderate intensity statin therapy.  He should have a follow-up  lipid profile and comprehensive metabolic panel within next 3 months.  Can be done before next follow-up.  Orders:    COMPREHENSIVE METABOLIC PANEL(12); Future    Lipid Panel With Direct LDL; Future

## 2025-01-08 ENCOUNTER — OFFICE VISIT (OUTPATIENT)
Dept: CARDIOLOGY CLINIC | Facility: CLINIC | Age: 30
End: 2025-01-08
Payer: COMMERCIAL

## 2025-01-08 VITALS
HEART RATE: 83 BPM | SYSTOLIC BLOOD PRESSURE: 124 MMHG | WEIGHT: 170.8 LBS | HEIGHT: 68 IN | BODY MASS INDEX: 25.88 KG/M2 | DIASTOLIC BLOOD PRESSURE: 82 MMHG | OXYGEN SATURATION: 99 %

## 2025-01-08 DIAGNOSIS — Z86.73 HISTORY OF TIA (TRANSIENT ISCHEMIC ATTACK): Primary | ICD-10-CM

## 2025-01-08 DIAGNOSIS — I25.3 INTERATRIAL SEPTAL ANEURYSM WITH PFO: ICD-10-CM

## 2025-01-08 DIAGNOSIS — R03.0 ELEVATED BLOOD PRESSURE READING IN OFFICE WITHOUT DIAGNOSIS OF HYPERTENSION: ICD-10-CM

## 2025-01-08 DIAGNOSIS — E78.2 MIXED HYPERLIPIDEMIA: ICD-10-CM

## 2025-01-08 DIAGNOSIS — Q21.12 INTERATRIAL SEPTAL ANEURYSM WITH PFO: ICD-10-CM

## 2025-01-08 PROCEDURE — 99214 OFFICE O/P EST MOD 30 MIN: CPT | Performed by: INTERNAL MEDICINE

## 2025-01-08 NOTE — ASSESSMENT & PLAN NOTE
No evidence of heart failure.  No evidence of cerebrovascular infarction on MRI.  Ordering limited echocardiogram as noted above.  Orders:    Echo follow up/limited w/ contrast if indicated; Future

## 2025-01-08 NOTE — ASSESSMENT & PLAN NOTE
Has abnormal lipids.  Is tolerating moderate intensity statin.  Last LFTs were normal.  Will request lipid profile and comprehensive metabolic panel prior to next visit in 3 months.  Goal LDL is around 70 mg/dL or less.  Orders:    Lipid panel; Future    COMPREHENSIVE METABOLIC PANEL(12); Future

## 2025-01-08 NOTE — PATIENT INSTRUCTIONS
DIAGNOSES:  1. History of transient ischemic attack  2. Interatrial septal aneurysm with small patent foramen ovale  3. Dyslipidemia  4. Elevated blood pressure without diagnosis of hypertension    ECHOCARDIOGRAM 10/13/2024:  Normal left ventricular size and systolic function.  EF 59%.  Normal diastolic function.  Normal right ventricular size and systolic function.  Normal left and right atrial cavity size.  Suspected small patent foramen ovale or atrial septal defect with predominant left-to-right shunting by color-flow Doppler.  No bubble study performed.  Normal aortic valve without stenosis or regurgitation.  Trace mitral and tricuspid valve regurgitation.  No obvious pulmonary hypertension.  No pericardial effusion.  Assessment & Plan  History of TIA (transient ischemic attack)  History of TIA in October 2024.  No evidence of infarction edema or mass effect noted on the MRI of the brain.  CTA did not and for significant cerebrovascular or carotid artery disease.  Does report some palpitations at night but not during the time.  No definite atrial fibrillation.  Was noted to have interatrial septal aneurysm with possible PFO but bubble study was not performed at last echo.  Now does have insurance but it is not the best 1 and he may have high co-pay for testing.    Advising to continue current medications.  Will arrange for a 48-hour Holter monitor and a limited echocardiogram with bubble study with agitated saline to evaluate for interatrial shunt.  Advising to bring in give us a record of his home blood pressure readings so that we can make a decision if he needs to be on antihypertensive medications.  Should have a follow-up blood work to reassess lipid profile and comprehensive metabolic panel prior to next visit in 3 months.  Encouraged to eat healthy and to exercise regularly.  Orders:    Holter monitor; Future    Echo follow up/limited w/ contrast if indicated; Future    Lipid panel; Future     COMPREHENSIVE METABOLIC PANEL(12); Future    Interatrial septal aneurysm with PFO  No evidence of heart failure.  No evidence of cerebrovascular infarction on MRI.  Ordering limited echocardiogram as noted above.  Orders:    Echo follow up/limited w/ contrast if indicated; Future    Mixed hyperlipidemia  Has abnormal lipids.  Is tolerating moderate intensity statin.  Last LFTs were normal.  Will request lipid profile and comprehensive metabolic panel prior to next visit in 3 months.  Goal LDL is around 70 mg/dL or less.  Orders:    Lipid panel; Future    COMPREHENSIVE METABOLIC PANEL(12); Future    Elevated blood pressure reading in office without diagnosis of hypertension  Blood pressure in office today is normal.  Reports intermittently elevated blood pressures at home.  Advised him to bring us a copy of his home blood pressure readings.  This can be given to the  so that we can review them and make decision regarding antihypertensive medications.  Advised to monitor blood pressures intermittently.

## 2025-01-08 NOTE — ASSESSMENT & PLAN NOTE
Blood pressure in office today is normal.  Reports intermittently elevated blood pressures at home.  Advised him to bring us a copy of his home blood pressure readings.  This can be given to the  so that we can review them and make decision regarding antihypertensive medications.  Advised to monitor blood pressures intermittently.

## 2025-01-08 NOTE — ASSESSMENT & PLAN NOTE
History of TIA in October 2024.  No evidence of infarction edema or mass effect noted on the MRI of the brain.  CTA did not and for significant cerebrovascular or carotid artery disease.  Does report some palpitations at night but not during the time.  No definite atrial fibrillation.  Was noted to have interatrial septal aneurysm with possible PFO but bubble study was not performed at last echo.  Now does have insurance but it is not the best 1 and he may have high co-pay for testing.    Advising to continue current medications.  Will arrange for a 48-hour Holter monitor and a limited echocardiogram with bubble study with agitated saline to evaluate for interatrial shunt.  Advising to bring in give us a record of his home blood pressure readings so that we can make a decision if he needs to be on antihypertensive medications.  Should have a follow-up blood work to reassess lipid profile and comprehensive metabolic panel prior to next visit in 3 months.  Encouraged to eat healthy and to exercise regularly.  Orders:    Holter monitor; Future    Echo follow up/limited w/ contrast if indicated; Future    Lipid panel; Future    COMPREHENSIVE METABOLIC PANEL(12); Future

## 2025-01-08 NOTE — PROGRESS NOTES
Name: Maurisio Ramirez      : 1995      MRN: [id number]  Encounter Provider: Belinda Alex MD  Encounter Date: 2025   Encounter department: [facility] CARDIOLOGY [place]    DIAGNOSES:  1. History of transient ischemic attack  2. Interatrial septal aneurysm with small patent foramen ovale  3. Dyslipidemia  4. Elevated blood pressure without diagnosis of hypertension    ECHOCARDIOGRAM 10/13/2024:  Normal left ventricular size and systolic function.  EF 59%.  Normal diastolic function.  Normal right ventricular size and systolic function.  Normal left and right atrial cavity size.  Suspected small patent foramen ovale or atrial septal defect with predominant left-to-right shunting by color-flow Doppler.  No bubble study performed.  Normal aortic valve without stenosis or regurgitation.  Trace mitral and tricuspid valve regurgitation.  No obvious pulmonary hypertension.  No pericardial effusion.  Assessment & Plan  History of TIA (transient ischemic attack)  History of TIA in 2024.  No evidence of infarction edema or mass effect noted on the MRI of the brain.  CTA did not and for significant cerebrovascular or carotid artery disease.  Does report some palpitations at night but not during the time.  No definite atrial fibrillation.  Was noted to have interatrial septal aneurysm with possible PFO but bubble study was not performed at last echo.  Now does have insurance but it is not the best 1 and he may have high co-pay for testing.    Advising to continue current medications.  Will arrange for a 48-hour Holter monitor and a limited echocardiogram with bubble study with agitated saline to evaluate for interatrial shunt.  Advising to bring in give us a record of his home blood pressure readings so that we can make a decision if he needs to be on antihypertensive medications.  Should have a follow-up blood work to reassess lipid profile and comprehensive metabolic panel prior to next visit in 3  months.  Encouraged to eat healthy and to exercise regularly.  Orders:    Holter monitor; Future    Echo follow up/limited w/ contrast if indicated; Future    Lipid panel; Future    COMPREHENSIVE METABOLIC PANEL(12); Future    Interatrial septal aneurysm with PFO  No evidence of heart failure.  No evidence of cerebrovascular infarction on MRI.  Ordering limited echocardiogram as noted above.  Orders:    Echo follow up/limited w/ contrast if indicated; Future    Mixed hyperlipidemia  Has abnormal lipids.  Is tolerating moderate intensity statin.  Last LFTs were normal.  Will request lipid profile and comprehensive metabolic panel prior to next visit in 3 months.  Goal LDL is around 70 mg/dL or less.  Orders:    Lipid panel; Future    COMPREHENSIVE METABOLIC PANEL(12); Future    Elevated blood pressure reading in office without diagnosis of hypertension  Blood pressure in office today is normal.  Reports intermittently elevated blood pressures at home.  Advised him to bring us a copy of his home blood pressure readings.  This can be given to the  so that we can review them and make decision regarding antihypertensive medications.  Advised to monitor blood pressures intermittently.           History of Present Illness   HPI  Maurisio Ramirez is a 29 y.o. male who presents regarding follow-up of his history of TIA and presence of interatrial septal aneurysm.  He was last seen by me in December 2024.  He was advised home blood pressure monitoring.  Further testing with bubble study with agitated saline and event monitoring were not ordered because of lack of insurance at that time.    History obtained from: patient    He is here for visit accompanied with his 's mother.  He mentions that since last visit he has had no new diagnoses or hospitalizations or significant illnesses.  From a symptom perspective he reports that he has been experiencing palpitations every time he lies down.  Denies symptoms during  the daytime.  Denies vision changes or focal weakness or shortness of breath or chest pain or passing out or near passing out episodes.  Reports being active and is able to do his job as a .  Reports that he has been monitoring his blood pressures at home and they have been sometimes normal while other times are elevated.  Reports been compliant with medications.    Functional capacity status: Good   (Excellent- >10 METs; Good: (7-10 METs); Moderate (4-7 METs); Poor (<= 4 METs)    Any chronic stressors: None   (feeling of poor health, financial problems, and social isolation etc).    Tobacco or alcohol dependence: He reports he has never been a smoker. He reports drinking alcohol occasionally. He denies any marijuana use or any illicit drug use.     Current cardiac medications: Aspirin 81 mg daily atorvastatin 40 mg daily    Last recent comprehensive blood work available:   Blood work from hospitalization is reviewed.  Hemoglobin and hematocrit are normal.  Sodium was 141 potassium 3.7418 bicarb 27 BUN 10 creatinine 0.94 GFR 91  Normal liver function test  Total cholesterol 168 triglyceride 144 HDL 31 calculated   CBC was unremarkable.  Hemoglobin A1c was 5.1  He has had no thyroid function test.  HIV screening test in 2021 were normal.    ECG: No results found for this visit on 01/08/25.    REVIEW OF SYSTEMS   Positive for: As noted above in HPI  Negative for: All remaining as reviewed below and in HPI.    SYSTEM SYMPTOMS REVIEWED:  General--weight change, fever, night sweats  Respiratory--cough, wheezing, shortness of breath, sputum production  Cardiovascular--chest pain, syncope, dyspnea on exertion, edema, decline in exercise tolerance, claudication   Gastrointestinal--persistent vomiting, diarrhea, abdominal distention, blood in stool   Muscular or skeletal--joint pain or swelling   Neurologic--headaches, syncope, abnormal movement  Hematologic--history of easy bruising and bleeding  "  Endocrine--thyroid enlargement, heat or cold intolerance, polyuria   Psychiatric--anxiety, depression     CURRENT MEDICATIONS LIST     Current Outpatient Medications:     atorvastatin (LIPITOR) 40 mg tablet, Take 1 tablet (40 mg total) by mouth daily with dinner, Disp: 30 tablet, Rfl: 3    aspirin 81 mg chewable tablet, Chew 1 tablet (81 mg total) daily (Patient not taking: Reported on 1/8/2025), Disp: 30 tablet, Rfl: 3       ALLERGIES   No Known Allergies    *-*-*-*-*-*-*-*-*-*-*-*-*-*-*-*-*-*-*-*-*-*-*-*-*-*-*-*-*-*-*-*-*-*-*-*-*-*-*-*-*-*-*-*-*-*-*-*-*-*-*-*-*-*-           Objective   /82   Pulse 83   Ht 5' 8\" (1.727 m)   Wt 77.5 kg (170 lb 12.8 oz)   SpO2 99%   BMI 25.97 kg/m²      Physical Exam  Constitutional:       Appearance: He is normal weight.   HENT:      Mouth/Throat:      Mouth: Mucous membranes are dry.   Neck:      Vascular: No carotid bruit.   Cardiovascular:      Rate and Rhythm: Normal rate and regular rhythm.      Heart sounds: No murmur heard.  Pulmonary:      Effort: Pulmonary effort is normal.      Breath sounds: Normal breath sounds.   Abdominal:      Palpations: Abdomen is soft.   Musculoskeletal:      Right lower leg: No edema.      Left lower leg: No edema.   Skin:     General: Skin is warm and dry.   Neurological:      Mental Status: He is alert and oriented to person, place, and time. Mental status is at baseline.   Psychiatric:         Mood and Affect: Mood normal.         Behavior: Behavior normal.           "

## 2025-02-07 ENCOUNTER — OFFICE VISIT (OUTPATIENT)
Dept: FAMILY MEDICINE CLINIC | Facility: CLINIC | Age: 30
End: 2025-02-07

## 2025-02-07 VITALS
DIASTOLIC BLOOD PRESSURE: 78 MMHG | HEIGHT: 68 IN | OXYGEN SATURATION: 99 % | BODY MASS INDEX: 24.25 KG/M2 | WEIGHT: 160 LBS | RESPIRATION RATE: 14 BRPM | HEART RATE: 84 BPM | TEMPERATURE: 97.6 F | SYSTOLIC BLOOD PRESSURE: 118 MMHG

## 2025-02-07 DIAGNOSIS — G45.9 TIA (TRANSIENT ISCHEMIC ATTACK): ICD-10-CM

## 2025-02-07 DIAGNOSIS — N47.1 PHIMOSIS: Primary | ICD-10-CM

## 2025-02-07 PROCEDURE — 99213 OFFICE O/P EST LOW 20 MIN: CPT | Performed by: STUDENT IN AN ORGANIZED HEALTH CARE EDUCATION/TRAINING PROGRAM

## 2025-02-07 RX ORDER — ASPIRIN 81 MG/1
81 TABLET, CHEWABLE ORAL DAILY
Qty: 30 TABLET | Refills: 3 | Status: SHIPPED | OUTPATIENT
Start: 2025-02-07 | End: 2025-06-07

## 2025-02-07 RX ORDER — BENZOCAINE/MENTHOL 6 MG-10 MG
LOZENGE MUCOUS MEMBRANE 4 TIMES DAILY PRN
Qty: 1.5 G | Refills: 0 | Status: SHIPPED | OUTPATIENT
Start: 2025-02-07

## 2025-02-07 NOTE — PROGRESS NOTES
"Name: Maurisio Ramirez      : 1995      MRN: [id number]  Encounter Provider: Yogi Johnson MD  Encounter Date: 2025   Encounter department: [facility]  :  Assessment & Plan  Phimosis  Gradually worsening since returning from [place]. No new sexual partners, recent infections, or known trauma.  No discharge or erythema, unlikely balanitis.   Denies UTI sxs.  Trial hydrocortisone cream daily. Continue daily hygenic cleaning.  Follow up in 2 weeks, per patient request. If doing well, follow up appointment in 4 weeks. If no improvement, then consider urology consult for circumcision.     Orders:    hydrocortisone 1 % cream; Apply topically 4 (four) times a day as needed for irritation    TIA (transient ischemic attack)  Asymptomatic. No residual FND.  Follow up appointment with cardiology on  for possible repeat echo with bubble study with agitated saline to evaluate for suspected PFO.    Orders:    aspirin 81 mg chewable tablet; Chew 1 tablet (81 mg total) daily           History of Present Illness   Maurisio Ramirez is a pleasant 29 y.o. male with a history of TIA without residual FND presents today with concerns for inability to retract foreskin.      Review of Systems   Constitutional:  Negative for chills, fatigue and fever.   Respiratory:  Negative for cough, chest tightness and shortness of breath.    Cardiovascular:  Negative for chest pain and palpitations.   Gastrointestinal:  Negative for abdominal pain, nausea and vomiting.   Genitourinary:  Negative for decreased urine volume, dysuria, flank pain, genital sores, hematuria, penile discharge, penile pain, penile swelling, scrotal swelling, testicular pain and urgency.       Objective   /78 (BP Location: Right arm, Patient Position: Sitting, Cuff Size: Standard)   Pulse 84   Temp 97.6 °F (36.4 °C) (Temporal)   Resp 14   Ht 5' 8\" (1.727 m)   Wt 72.6 kg (160 lb)   SpO2 99%   BMI 24.33 kg/m²      Physical " Exam  Vitals reviewed.   Constitutional:       General: He is not in acute distress.     Appearance: Normal appearance. He is not ill-appearing.   HENT:      Nose: Nose normal.      Mouth/Throat:      Mouth: Mucous membranes are moist.   Cardiovascular:      Rate and Rhythm: Normal rate and regular rhythm.      Pulses: Normal pulses.      Heart sounds: Normal heart sounds.   Pulmonary:      Effort: Pulmonary effort is normal.      Breath sounds: Normal breath sounds.   Abdominal:      General: Bowel sounds are normal. There is no distension.      Palpations: Abdomen is soft.      Tenderness: There is no abdominal tenderness.   Genitourinary:     Penis: Uncircumcised. Phimosis present. No erythema, tenderness, discharge, swelling or lesions.       Testes: Normal.      Bartolo stage (genital): 5.   Skin:     Capillary Refill: Capillary refill takes less than 2 seconds.      Findings: No rash.   Neurological:      Mental Status: He is alert.

## 2025-02-21 ENCOUNTER — OFFICE VISIT (OUTPATIENT)
Dept: FAMILY MEDICINE CLINIC | Facility: CLINIC | Age: 30
End: 2025-02-21

## 2025-02-21 VITALS
OXYGEN SATURATION: 98 % | TEMPERATURE: 98.4 F | HEART RATE: 77 BPM | HEIGHT: 68 IN | RESPIRATION RATE: 18 BRPM | DIASTOLIC BLOOD PRESSURE: 86 MMHG | SYSTOLIC BLOOD PRESSURE: 132 MMHG | WEIGHT: 164.8 LBS | BODY MASS INDEX: 24.98 KG/M2

## 2025-02-21 DIAGNOSIS — N47.1 PHIMOSIS: Primary | ICD-10-CM

## 2025-02-21 PROCEDURE — 99213 OFFICE O/P EST LOW 20 MIN: CPT

## 2025-02-21 NOTE — PROGRESS NOTES
"Name: Maurisio Ramirez      : 1995      MRN: [id number]  Encounter Provider: Yogi Johnson MD  Encounter Date: 2025   Encounter department: [facility]  :  Assessment & Plan  Phimosis  Unchanged with use of hydrocortisone cream daily.  No discharge, erythema, dysuria. STI unlikely.  No issues with urination.   Refer to urology for possible circumcision. List of physicians provided, patient will call to make appointment.   Follow up in 3 months or sooner if symptoms of fail to improve or worsen.  Orders:    Ambulatory Referral to Urology; Future           History of Present Illness   Maurisio Ramirez is a pleasant 29 y.o. male with a history of TIA without residual FND presents today with concerns for inability to retract foreskin.      Review of Systems   Constitutional:  Negative for chills, fatigue and fever.   Respiratory:  Negative for chest tightness and shortness of breath.    Gastrointestinal:  Negative for nausea and vomiting.   Genitourinary:  Negative for decreased urine volume, difficulty urinating, dysuria, flank pain, frequency, genital sores, hematuria, penile discharge, penile pain, penile swelling, scrotal swelling, testicular pain and urgency.       Objective   /86 (BP Location: Left arm, Patient Position: Sitting, Cuff Size: Standard)   Pulse 77   Temp 98.4 °F (36.9 °C) (Temporal)   Resp 18   Ht 5' 8\" (1.727 m)   Wt 74.8 kg (164 lb 12.8 oz)   SpO2 98%   BMI 25.06 kg/m²      Physical Exam  Vitals reviewed. Exam conducted with a chaperone present.   HENT:      Nose: Nose normal.      Mouth/Throat:      Mouth: Mucous membranes are moist.   Eyes:      Extraocular Movements: Extraocular movements intact.   Cardiovascular:      Rate and Rhythm: Normal rate and regular rhythm.      Pulses: Normal pulses.      Heart sounds: Normal heart sounds.   Pulmonary:      Effort: Pulmonary effort is normal.      Breath sounds: Normal breath sounds. "   Abdominal:      Tenderness: There is no abdominal tenderness.   Genitourinary:     Pubic Area: No rash or pubic lice.       Penis: Uncircumcised. Phimosis present. No hypospadias, erythema, tenderness, discharge, swelling or lesions.       Testes: Normal.      Bartolo stage (genital): 5.   Neurological:      Mental Status: He is alert.

## 2025-02-26 ENCOUNTER — TELEPHONE (OUTPATIENT)
Age: 30
End: 2025-02-26

## 2025-02-26 NOTE — TELEPHONE ENCOUNTER
New Patient    Appointment Scheduling  What office location does the patient prefer?:[facility]  What is the reason for the patient's appointment?:Phimosis  Have patient records been requested?:no  If No, are the records showing in Epic: yes    Appointment Details  Date: 03/07/25  Time: 10:30     Location: [facility]  Provider:    Does the appointment need further review? (Reason)      HISTORY  Is the patient having active symptoms? If so, describe symptoms:  Has the patient had any previous Urologist(s)?:no  Was the patient seen in the ED?:no  Has the patient had any outside testing done?:no  Does patient have Imaging/Lab Results:no  Does the patient have a personal history of any cancer?:no    INSURANCE   Have you confirmed Patient's insurance? yes  Is the insurance accepted?  yes  Is the insurance active?yes

## 2025-03-07 ENCOUNTER — CONSULT (OUTPATIENT)
Dept: UROLOGY | Facility: MEDICAL CENTER | Age: 30
End: 2025-03-07
Payer: COMMERCIAL

## 2025-03-07 VITALS
HEIGHT: 68 IN | DIASTOLIC BLOOD PRESSURE: 88 MMHG | SYSTOLIC BLOOD PRESSURE: 124 MMHG | WEIGHT: 166 LBS | HEART RATE: 80 BPM | OXYGEN SATURATION: 99 % | BODY MASS INDEX: 25.16 KG/M2

## 2025-03-07 DIAGNOSIS — N47.1 PHIMOSIS: ICD-10-CM

## 2025-03-07 PROCEDURE — 99203 OFFICE O/P NEW LOW 30 MIN: CPT | Performed by: UROLOGY

## 2025-03-07 RX ORDER — EMTRICITABINE AND TENOFOVIR ALAFENAMIDE 200; 25 MG/1; MG/1
1 TABLET ORAL DAILY
COMMUNITY

## 2025-03-07 NOTE — PROGRESS NOTES
"   HISTORY:    Tight phimosis for about a year or so, much worse the past 3 months.  Has tried topical steroids, antibiotic cream nothing helps.    Cannot pull foreskin back at all    Denies any voiding symptoms associated    No diabetes or other clear etiology         ASSESSMENT / PLAN:    Recommend circumcision, potential complication discussed, patient agrees and we will schedule        Review of Systems      Objective:     Physical Exam  Genitourinary:     Comments: Penis with very tight phimosis, loss of pigment at the very tip, cannot pull back    Testes normal          No results found for: \"PSA\"]  BUN   Date Value Ref Range Status   10/11/2024 10 5 - 25 mg/dL Final     Creatinine   Date Value Ref Range Status   10/11/2024 0.94 0.60 - 1.30 mg/dL Final     Comment:     Standardized to IDMS reference method     No components found for: \"CBC\"    Patient Active Problem List   Diagnosis    History of TIA (transient ischemic attack)    Hypokalemia    Hyperlipidemia    Interatrial septal aneurysm with PFO    Elevated blood pressure reading in office without diagnosis of hypertension        Patient ID: Maurisio Ramirez is a 29 y.o. male.      Current Outpatient Medications:     aspirin 81 mg chewable tablet, Chew 1 tablet (81 mg total) daily, Disp: 30 tablet, Rfl: 3    atorvastatin (LIPITOR) 40 mg tablet, Take 1 tablet (40 mg total) by mouth daily with dinner, Disp: 30 tablet, Rfl: 3    Descovy 200-25 MG tablet, Take 1 tablet by mouth daily, Disp: , Rfl:     hydrocortisone 1 % cream, Apply topically 4 (four) times a day as needed for irritation (Patient not taking: Reported on 3/7/2025), Disp: 1.5 g, Rfl: 0                  "

## 2025-03-11 ENCOUNTER — TELEPHONE (OUTPATIENT)
Dept: UROLOGY | Facility: MEDICAL CENTER | Age: 30
End: 2025-03-11

## 2025-03-11 ENCOUNTER — HOSPITAL ENCOUNTER (OUTPATIENT)
Dept: NON INVASIVE DIAGNOSTICS | Facility: HOSPITAL | Age: 30
Discharge: HOME | End: 2025-03-11
Attending: INTERNAL MEDICINE
Payer: COMMERCIAL

## 2025-03-11 ENCOUNTER — PREP FOR PROCEDURE (OUTPATIENT)
Dept: UROLOGY | Facility: MEDICAL CENTER | Age: 30
End: 2025-03-11

## 2025-03-11 VITALS
SYSTOLIC BLOOD PRESSURE: 124 MMHG | BODY MASS INDEX: 25.16 KG/M2 | HEART RATE: 80 BPM | HEIGHT: 68 IN | WEIGHT: 166 LBS | DIASTOLIC BLOOD PRESSURE: 88 MMHG

## 2025-03-11 DIAGNOSIS — Z86.73 HISTORY OF TIA (TRANSIENT ISCHEMIC ATTACK): ICD-10-CM

## 2025-03-11 DIAGNOSIS — I25.3 INTERATRIAL SEPTAL ANEURYSM WITH PFO: ICD-10-CM

## 2025-03-11 DIAGNOSIS — Q21.12 INTERATRIAL SEPTAL ANEURYSM WITH PFO: ICD-10-CM

## 2025-03-11 PROCEDURE — 93325 DOPPLER ECHO COLOR FLOW MAPG: CPT | Performed by: INTERNAL MEDICINE

## 2025-03-11 PROCEDURE — 93308 TTE F-UP OR LMTD: CPT

## 2025-03-11 PROCEDURE — 93321 DOPPLER ECHO F-UP/LMTD STD: CPT

## 2025-03-11 PROCEDURE — 93226 XTRNL ECG REC<48 HR SCAN A/R: CPT

## 2025-03-11 PROCEDURE — 93225 XTRNL ECG REC<48 HRS REC: CPT

## 2025-03-11 PROCEDURE — 93321 DOPPLER ECHO F-UP/LMTD STD: CPT | Performed by: INTERNAL MEDICINE

## 2025-03-11 PROCEDURE — 93325 DOPPLER ECHO COLOR FLOW MAPG: CPT

## 2025-03-11 PROCEDURE — 93308 TTE F-UP OR LMTD: CPT | Performed by: INTERNAL MEDICINE

## 2025-03-11 NOTE — TELEPHONE ENCOUNTER
Spoke with patient and confirmed surgery date of 4/1  Type of surgery: Circ   Operating physician:Dr. Fox  Location of surgery: [place] OR    Verbally went over prep with patient on 3/11  NPO  Bowel prep? No  Hospital calls afternoon prior with arrival time (calls Friday afternoon for Monday surgery)  Patient needs ride to and from surgery   outpatient  Pre-op testing to be done 2 weeks prior to surgery. All testing can be done as a walk-in. EKG can only be done as a walk-in at any [facility].  NO LABS NEEDED  Blood thinners:   Aspirin 7 days   Clearances needed: None    Emailed  & mailed to patient on 3/11  Copy of packet scanned into Media  Labs in packet and in electronic record   Soap prep in packet  post-op in packet     Consent: in media     Medication Suspension of: Aspirin  Ordering provider: PCP  Faxed Medication Suspension form on: 3/11  Best fax number: [phone]

## 2025-03-13 LAB
ASCENDING AORTA: 2.8 CM
E WAVE DECELERATION TIME: 210 MS
E/A RATIO: 1.2
LV EF US.2D.A4C+ESTIMATED: 58 %
MV PEAK A VEL: 0.51 M/S
MV PEAK E VEL: 61 CM/S
TR MAX PG: 12 MMHG
TR PEAK VELOCITY: 1.7 M/S
TRICUSPID VALVE PEAK REGURGITATION VELOCITY: 1.74 M/S

## 2025-03-21 ENCOUNTER — ANESTHESIA EVENT (OUTPATIENT)
Dept: PERIOP | Facility: HOSPITAL | Age: 30
End: 2025-03-21
Payer: COMMERCIAL

## 2025-03-21 PROCEDURE — 93227 XTRNL ECG REC<48 HR R&I: CPT | Performed by: INTERNAL MEDICINE

## 2025-03-21 NOTE — PRE-PROCEDURE INSTRUCTIONS
Pre-Surgery Instructions:   Medication Instructions    aspirin 81 mg chewable tablet Hold day of surgery.    Descovy 200-25 MG tablet        Atorvastatin(LIPITOR) 40mg tablet Hold day of surgery.      Take night before surgery    Medication instructions for day of surgery reviewed. Please take all instructed medications with only a sip of water.       You will receive a call one business day prior to surgery with an arrival time and hospital directions. If your surgery is scheduled on a Monday, the hospital will be calling you on the Friday prior to your surgery. If you have not heard from anyone by 8pm, please call the hospital supervisor through the hospital  at [phone]. ([place] [phone] or [place] [phone]).    Do not eat or drink anything after midnight the night before your surgery, including candy, mints, lifesavers, or chewing gum. Do not drink alcohol 24hrs before your surgery. Try not to smoke at least 24hrs before your surgery.       Follow the pre surgery showering instructions as listed in the “My Surgical Experience Booklet” or otherwise provided by your surgeon's office. Do not use a blade to shave the surgical area 1 week before surgery. It is okay to use a clean electric clippers up to 24 hours before surgery. Do not apply any lotions, creams, including makeup, cologne, deodorant, or perfumes after showering on the day of your surgery. Do not use dry shampoo, hair spray, hair gel, or any type of hair products.     No contact lenses, eye make-up, or artificial eyelashes. Remove nail polish, including gel polish, and any artificial, gel, or acrylic nails if possible. Remove all jewelry including rings and body piercing jewelry.     Wear causal clothing that is easy to take on and off. Consider your type of surgery.    Keep any valuables, jewelry, piercings at home. Please bring any specially ordered equipment (sling, braces) if indicated.    Arrange for a responsible person to  drive you to and from the hospital on the day of your surgery. Please confirm the visitor policy for the day of your procedure when you receive your phone call with an arrival time.     Call the surgeon's office with any new illnesses, exposures, or additional questions prior to surgery.    Please reference your “My Surgical Experience Booklet” for additional information to prepare for your upcoming surgery.

## 2025-03-27 PROBLEM — R93.1 ABNORMAL FINDING ON ECHOCARDIOGRAM: Status: ACTIVE | Noted: 2025-03-27

## 2025-03-27 PROBLEM — Q21.12 INTERATRIAL SEPTAL ANEURYSM WITH PFO: Status: RESOLVED | Noted: 2024-12-12 | Resolved: 2025-03-27

## 2025-03-27 PROBLEM — I25.3 INTERATRIAL SEPTAL ANEURYSM WITH PFO: Status: RESOLVED | Noted: 2024-12-12 | Resolved: 2025-03-27

## 2025-03-31 PROCEDURE — 99024 POSTOP FOLLOW-UP VISIT: CPT | Performed by: UROLOGY

## 2025-03-31 NOTE — H&P
"HISTORY AND PHYSICAL  ?  ?  Patient Name: Maurisio Ramirez  Patient MRN: [id number]  Attending Provider: Aroldo Fox MD  Service: Urology  Chief Complaint    Phimosis    HPI   Maurisio Ramirez is a 29 y.o. male with above history, tight foreskin, unable to easily retract, inflammation  I plan circumcision  Potential risks and complications discussed, and informed consent was given by the patient.  Medications  Meds/Allergies   No current facility-administered medications for this encounter.      Cannot display prior to admission medications because the patient has not been admitted in this contact.     No current facility-administered medications for this encounter.    Current Outpatient Medications:     aspirin 81 mg chewable tablet, Chew 1 tablet (81 mg total) daily, Disp: 30 tablet, Rfl: 3    atorvastatin (LIPITOR) 40 mg tablet, Take 1 tablet (40 mg total) by mouth daily with dinner, Disp: 30 tablet, Rfl: 3    Descovy 200-25 MG tablet, Take 1 tablet by mouth daily, Disp: , Rfl:     hydrocortisone 1 % cream, Apply topically 4 (four) times a day as needed for irritation (Patient not taking: Reported on 3/7/2025), Disp: 1.5 g, Rfl: 0  Review of Systems  10 point review of systems negative except as noted in HPI  Allergies  No Known Allergies  PMH  Past Medical History:   Diagnosis Date    Dental crowns present     Veneers upper and lower    Exercises two times per week     Mini stroke 2024    \"no lasting effect\"    Patent foramen ovale     sees [facility] cardio Dr Alex    Wears glasses      Past surgical history  Past Surgical History:   Procedure Laterality Date    NO PAST SURGERIES       Social history  Social History     Tobacco Use    Smoking status: Never    Smokeless tobacco: Former    Tobacco comments:     Quit vaping approx 4 months ago   Vaping Use    Vaping status: Never Used   Substance Use Topics    Alcohol use: No    Drug use: No     ?  Physical Exam    .vs  General appearance: alert and oriented, in no acute " distress  Head: Normocephalic, without obvious abnormality, atraumatic  Throat: lips, mucosa, and tongue normal; teeth and gums normal  Neck: no adenopathy, no carotid bruit, no JVD, supple, symmetrical, trachea midline, and thyroid not enlarged, symmetric, no tenderness/mass/nodules  Lungs: clear to auscultation bilaterally  Heart: regular rate and rhythm, S1, S2 normal, no murmur, click, rub or gallop  Abdomen: soft, non-tender; bowel sounds normal; no masses,  no organomegaly  Extremities: extremities normal, warm and well-perfused; no cyanosis, clubbing, or edema  Aroldo Fox MD

## 2025-04-01 ENCOUNTER — ANESTHESIA (OUTPATIENT)
Dept: PERIOP | Facility: HOSPITAL | Age: 30
End: 2025-04-01
Payer: COMMERCIAL

## 2025-04-01 ENCOUNTER — HOSPITAL ENCOUNTER (OUTPATIENT)
Facility: HOSPITAL | Age: 30
Setting detail: OUTPATIENT SURGERY
Discharge: HOME | End: 2025-04-01
Attending: UROLOGY | Admitting: UROLOGY
Payer: COMMERCIAL

## 2025-04-01 VITALS
RESPIRATION RATE: 16 BRPM | OXYGEN SATURATION: 100 % | HEART RATE: 66 BPM | WEIGHT: 168 LBS | DIASTOLIC BLOOD PRESSURE: 74 MMHG | BODY MASS INDEX: 26.37 KG/M2 | SYSTOLIC BLOOD PRESSURE: 121 MMHG | HEIGHT: 67 IN | TEMPERATURE: 97.3 F

## 2025-04-01 DIAGNOSIS — N47.1 PHIMOSIS: ICD-10-CM

## 2025-04-01 PROCEDURE — 88304 TISSUE EXAM BY PATHOLOGIST: CPT | Performed by: PATHOLOGY

## 2025-04-01 PROCEDURE — 54161 CIRCUM 28 DAYS OR OLDER: CPT | Performed by: UROLOGY

## 2025-04-01 PROCEDURE — 88342 IMHCHEM/IMCYTCHM 1ST ANTB: CPT | Performed by: PATHOLOGY

## 2025-04-01 PROCEDURE — 99024 POSTOP FOLLOW-UP VISIT: CPT | Performed by: UROLOGY

## 2025-04-01 PROCEDURE — 88341 IMHCHEM/IMCYTCHM EA ADD ANTB: CPT | Performed by: PATHOLOGY

## 2025-04-01 PROCEDURE — 88312 SPECIAL STAINS GROUP 1: CPT | Performed by: PATHOLOGY

## 2025-04-01 RX ORDER — FENTANYL CITRATE 50 UG/ML
INJECTION, SOLUTION INTRAMUSCULAR; INTRAVENOUS AS NEEDED
Status: DISCONTINUED | OUTPATIENT
Start: 2025-04-01 | End: 2025-04-01

## 2025-04-01 RX ORDER — HYDROCODONE BITARTRATE AND ACETAMINOPHEN 5; 325 MG/1; MG/1
1 TABLET ORAL EVERY 4 HOURS PRN
Qty: 20 TABLET | Refills: 0 | Status: SHIPPED | OUTPATIENT
Start: 2025-04-01 | End: 2025-04-09

## 2025-04-01 RX ORDER — MIDAZOLAM HYDROCHLORIDE 2 MG/2ML
INJECTION, SOLUTION INTRAMUSCULAR; INTRAVENOUS AS NEEDED
Status: DISCONTINUED | OUTPATIENT
Start: 2025-04-01 | End: 2025-04-01

## 2025-04-01 RX ORDER — ACETAMINOPHEN 325 MG/1
975 TABLET ORAL ONCE
Status: COMPLETED | OUTPATIENT
Start: 2025-04-01 | End: 2025-04-01

## 2025-04-01 RX ORDER — BUPIVACAINE HYDROCHLORIDE 5 MG/ML
INJECTION, SOLUTION EPIDURAL; INTRACAUDAL; PERINEURAL AS NEEDED
Status: DISCONTINUED | OUTPATIENT
Start: 2025-04-01 | End: 2025-04-01 | Stop reason: HOSPADM

## 2025-04-01 RX ORDER — OXYCODONE AND ACETAMINOPHEN 5; 325 MG/1; MG/1
1 TABLET ORAL EVERY 4 HOURS PRN
Status: DISCONTINUED | OUTPATIENT
Start: 2025-04-01 | End: 2025-04-01 | Stop reason: HOSPADM

## 2025-04-01 RX ORDER — SODIUM CHLORIDE, SODIUM LACTATE, POTASSIUM CHLORIDE, CALCIUM CHLORIDE 600; 310; 30; 20 MG/100ML; MG/100ML; MG/100ML; MG/100ML
125 INJECTION, SOLUTION INTRAVENOUS CONTINUOUS
Status: DISCONTINUED | OUTPATIENT
Start: 2025-04-01 | End: 2025-04-01 | Stop reason: HOSPADM

## 2025-04-01 RX ORDER — PROPOFOL 10 MG/ML
INJECTION, EMULSION INTRAVENOUS AS NEEDED
Status: DISCONTINUED | OUTPATIENT
Start: 2025-04-01 | End: 2025-04-01

## 2025-04-01 RX ORDER — ONDANSETRON 2 MG/ML
4 INJECTION INTRAMUSCULAR; INTRAVENOUS ONCE AS NEEDED
Status: DISCONTINUED | OUTPATIENT
Start: 2025-04-01 | End: 2025-04-01 | Stop reason: HOSPADM

## 2025-04-01 RX ORDER — LIDOCAINE HYDROCHLORIDE 20 MG/ML
INJECTION, SOLUTION EPIDURAL; INFILTRATION; INTRACAUDAL; PERINEURAL AS NEEDED
Status: DISCONTINUED | OUTPATIENT
Start: 2025-04-01 | End: 2025-04-01

## 2025-04-01 RX ORDER — OXYCODONE AND ACETAMINOPHEN 5; 325 MG/1; MG/1
2 TABLET ORAL EVERY 4 HOURS PRN
Status: DISCONTINUED | OUTPATIENT
Start: 2025-04-01 | End: 2025-04-01 | Stop reason: HOSPADM

## 2025-04-01 RX ORDER — HYDROMORPHONE HCL/PF 1 MG/ML
SYRINGE (ML) INJECTION AS NEEDED
Status: DISCONTINUED | OUTPATIENT
Start: 2025-04-01 | End: 2025-04-01

## 2025-04-01 RX ORDER — FENTANYL CITRATE/PF 50 MCG/ML
25 SYRINGE (ML) INJECTION
Status: COMPLETED | OUTPATIENT
Start: 2025-04-01 | End: 2025-04-01

## 2025-04-01 RX ORDER — DEXAMETHASONE SODIUM PHOSPHATE 10 MG/ML
INJECTION, SOLUTION INTRAMUSCULAR; INTRAVENOUS AS NEEDED
Status: DISCONTINUED | OUTPATIENT
Start: 2025-04-01 | End: 2025-04-01

## 2025-04-01 RX ORDER — ONDANSETRON 2 MG/ML
INJECTION INTRAMUSCULAR; INTRAVENOUS AS NEEDED
Status: DISCONTINUED | OUTPATIENT
Start: 2025-04-01 | End: 2025-04-01

## 2025-04-01 RX ORDER — CEFAZOLIN SODIUM 2 G/50ML
2000 SOLUTION INTRAVENOUS ONCE
Status: COMPLETED | OUTPATIENT
Start: 2025-04-01 | End: 2025-04-01

## 2025-04-01 RX ADMIN — FENTANYL CITRATE 25 MCG: 50 INJECTION INTRAMUSCULAR; INTRAVENOUS at 12:54

## 2025-04-01 RX ADMIN — MIDAZOLAM 2 MG: 1 INJECTION INTRAMUSCULAR; INTRAVENOUS at 11:25

## 2025-04-01 RX ADMIN — FENTANYL CITRATE 25 MCG: 50 INJECTION INTRAMUSCULAR; INTRAVENOUS at 12:49

## 2025-04-01 RX ADMIN — PROPOFOL 50 MG: 10 INJECTION, EMULSION INTRAVENOUS at 12:12

## 2025-04-01 RX ADMIN — FENTANYL CITRATE 25 MCG: 50 INJECTION INTRAMUSCULAR; INTRAVENOUS at 12:44

## 2025-04-01 RX ADMIN — FENTANYL CITRATE 25 MCG: 50 INJECTION INTRAMUSCULAR; INTRAVENOUS at 12:59

## 2025-04-01 RX ADMIN — ONDANSETRON 4 MG: 2 INJECTION INTRAMUSCULAR; INTRAVENOUS at 11:32

## 2025-04-01 RX ADMIN — ACETAMINOPHEN 975 MG: 325 TABLET, FILM COATED ORAL at 09:38

## 2025-04-01 RX ADMIN — SODIUM CHLORIDE, SODIUM LACTATE, POTASSIUM CHLORIDE, AND CALCIUM CHLORIDE: .6; .31; .03; .02 INJECTION, SOLUTION INTRAVENOUS at 11:39

## 2025-04-01 RX ADMIN — DEXAMETHASONE SODIUM PHOSPHATE 10 MG: 10 INJECTION, SOLUTION INTRAMUSCULAR; INTRAVENOUS at 11:32

## 2025-04-01 RX ADMIN — PROPOFOL 200 MG: 10 INJECTION, EMULSION INTRAVENOUS at 11:30

## 2025-04-01 RX ADMIN — HYDROMORPHONE HYDROCHLORIDE 0.5 MG: 1 INJECTION, SOLUTION INTRAMUSCULAR; INTRAVENOUS; SUBCUTANEOUS at 11:48

## 2025-04-01 RX ADMIN — LIDOCAINE HYDROCHLORIDE 100 MG: 20 INJECTION, SOLUTION EPIDURAL; INFILTRATION; INTRACAUDAL at 11:30

## 2025-04-01 RX ADMIN — CEFAZOLIN SODIUM 2000 MG: 2 SOLUTION INTRAVENOUS at 11:23

## 2025-04-01 RX ADMIN — SODIUM CHLORIDE, SODIUM LACTATE, POTASSIUM CHLORIDE, AND CALCIUM CHLORIDE 125 ML/HR: .6; .31; .03; .02 INJECTION, SOLUTION INTRAVENOUS at 09:59

## 2025-04-01 RX ADMIN — FENTANYL CITRATE 100 MCG: 50 INJECTION INTRAMUSCULAR; INTRAVENOUS at 11:29

## 2025-04-01 NOTE — DISCHARGE INSTR - AVS FIRST PAGE
ALL YOUR  PREVIOUS MEDS ARE THE SAME.    NEW MEDS: Hydrocodone if needed for pain    Keep the bandage dry until Thursday morning.  Thursday, unwrap the bandage.  You will see some blood spotting on the stitches.    You can shower and wash soap and water lightly over the penis    Once it is dry, place Neosporin ointment on the stitches twice per day for 10 days.    ACTIVITY:  RESUME FULL ACTIVITY 5 days, but be careful to not let the penis get hit.

## 2025-04-01 NOTE — ANESTHESIA POSTPROCEDURE EVALUATION
Post-Op Assessment Note    CV Status:  Stable  Pain Score: 0    Pain management: adequate       Mental Status:  Sleepy   Hydration Status:  Euvolemic   PONV Controlled:  Controlled   Airway Patency:  Patent and adequate  Two or more mitigation strategies used for obstructive sleep apnea   Post Op Vitals Reviewed: Yes    No anethesia notable event occurred.    Staff: CRNA           Last Filed PACU Vitals:  Vitals Value Taken Time   Temp 97.4    Pulse 62    /74    Resp 20    SpO2 100

## 2025-04-01 NOTE — OP NOTE
OPERATIVE REPORT  PATIENT NAME: Maurisio Ramirez    :  1995  MRN: [id number]  Pt Location: [facility] OR ROOM 06    SURGERY DATE: 2025    Surgeons and Role:     * Aroldo Fox MD - Primary    Preop Diagnosis:  Phimosis [N47.1]    Post-Op Diagnosis Codes:     * Phimosis [N47.1]    Procedure(s):  CIRCUMCISION ADULT    Specimen(s):  ID Type Source Tests Collected by Time Destination   1 : Foreskin Tissue Foreskin TISSUE EXAM Aroldo Fox MD 2025 11:50 AM        Estimated Blood Loss:   Minimal    Drains:  * No LDAs found *    Anesthesia Type:   IV Sedation with Anesthesia    Operative Indications:  Phimosis [N47.1]      Operative Findings:  Tight foreskin      Complications:   None    Procedure and Technique:    After the patient was placed under adequate general anesthesia, he was prepped and draped using chlorhexidine in supine position.  0.5% Marcaine was used as a penile block, 10 mL.    A circumcising incision was made on the skin of the penis just proximal to the corona and taken down to the subcutaneous tissue.    A similar incision was made circumferentially on the prepuce of the shaft of the penis, about 1 cm proximal to the corona.    Hemostasis was obtained using electrocautery.    The foreskin was dissected off using sharp and blunt dissection.  Care was taken not to injure the urethra, or the glans penis.    The two free cut edges of foreskin were reapproximated with a combination of simple and running 4-0 Vicryl suture.  At the completion of the procedure, there was no significant bleeding.  A sterile dressing applied, patient taken recovery in good condition.   I was present for the entire procedure.    Patient Disposition:  PACU              SIGNATURE: Aroldo Fox MD  DATE: 2025  TIME: 12:24 PM

## 2025-04-01 NOTE — ANESTHESIA POSTPROCEDURE EVALUATION
Post-Op Assessment Note    CV Status:  Stable    Pain management: adequate       Mental Status:  Awake   Hydration Status:  Stable   PONV Controlled:  Controlled   Airway Patency:  Patent     Post Op Vitals Reviewed: Yes    No anethesia notable event occurred.    Staff: Anesthesiologist           Last Filed PACU Vitals:  Vitals Value Taken Time   Temp 97.2 °F (36.2 °C) 04/01/25 1309   Pulse 67 04/01/25 1309   /75 04/01/25 1307   Resp 12 04/01/25 1308   SpO2 98 % 04/01/25 1308   Vitals shown include unfiled device data.    Modified Shreya:     Vitals Value Taken Time   Activity 2 04/01/25 1309   Respiration 2 04/01/25 1309   Circulation 2 04/01/25 1309   Consciousness 2 04/01/25 1309   Oxygen Saturation 2 04/01/25 1309     Modified Shreya Score: 10

## 2025-04-01 NOTE — DISCHARGE SUMMARY
Discharge Summary - Maurisio Ramirez 29 y.o. male MRN: [id number]    Admission Date: 4/1/2025     Admitting Diagnosis: Phimosis [N47.1]    Procedures Performed: Circumcision    Patient underwent planned outpt surgery and recovered without complication. Discharged in good condition.  Medications were prescribed.  Pt knows to have office follow-up at the appropriate time.

## 2025-04-01 NOTE — ANESTHESIA PREPROCEDURE EVALUATION
Procedure:  CIRCUMCISION ADULT (Penis)    Relevant Problems   CARDIO   (+) Hyperlipidemia      Neurology/Sleep   (+) History of TIA (transient ischemic attack)      Other   (+) Interatrial septal aneurysm without evidence of Patent Foramen Ovale.        Physical Exam    Airway    Mallampati score: I  TM Distance: >3 FB  Neck ROM: full     Dental       Cardiovascular  Cardiovascular exam normal    Pulmonary  Pulmonary exam normal     Other Findings        Anesthesia Plan  ASA Score- 2     Anesthesia Type- general with ASA Monitors.         Additional Monitors:     Airway Plan: LMA.           Plan Factors-Exercise tolerance (METS): >4 METS.    Chart reviewed.  Imaging results reviewed.  Patient summary reviewed.    Patient is not a current smoker.              Induction- intravenous.    Postoperative Plan-     Perioperative Resuscitation Plan - Level 1 - Full Code.       Informed Consent- Anesthetic plan and risks discussed with patient.        NPO Status:  Vitals Value Taken Time   Date of last liquid 04/01/25 04/01/25 0939   Time of last liquid 0939 04/01/25 0939   Date of last solid 03/31/25 04/01/25 0939   Time of last solid 2000 04/01/25 0939

## 2025-04-02 ENCOUNTER — TELEPHONE (OUTPATIENT)
Dept: UROLOGY | Facility: MEDICAL CENTER | Age: 30
End: 2025-04-02

## 2025-04-02 NOTE — TELEPHONE ENCOUNTER
"Post Op Note    Maurisio Ramirez is a 29 y.o. male s/p CIRCUMCISION  performed  4/1/25.  Maurisio Ramirez is a patient of Dr. Dr. Fox and is seen at the [place] office.     How would you rate your pain on a scale from 1 to 10, 10 being the worst pain ever? 1 Pt is taking Tylenol    Have you had a fever? No    Have your bowel movements been regular? No Pt advised to take stool softeners with 60 oz of hydration per day    Do you have any difficulty urinating? No    If the patient has an incision- do you have any redness around the incision or any drainage from the incision? Pt was instructed to remove dressing tomorrow.  He will report any signs of infection or drainage.    Do you have any other questions or concerns that I can address at this time?     Pt states he is doing \"good\".  Confirmed po appt for 4/15/25.  He knows to contact the office with any questions or concerns.     "

## 2025-04-06 DIAGNOSIS — G45.9 TIA (TRANSIENT ISCHEMIC ATTACK): ICD-10-CM

## 2025-04-07 RX ORDER — ATORVASTATIN CALCIUM 40 MG/1
TABLET, FILM COATED ORAL
Qty: 30 TABLET | Refills: 3 | Status: SHIPPED | OUTPATIENT
Start: 2025-04-07

## 2025-04-10 PROCEDURE — 88342 IMHCHEM/IMCYTCHM 1ST ANTB: CPT | Performed by: PATHOLOGY

## 2025-04-10 PROCEDURE — 88304 TISSUE EXAM BY PATHOLOGIST: CPT | Performed by: PATHOLOGY

## 2025-04-10 PROCEDURE — 88341 IMHCHEM/IMCYTCHM EA ADD ANTB: CPT | Performed by: PATHOLOGY

## 2025-04-10 PROCEDURE — 88312 SPECIAL STAINS GROUP 1: CPT | Performed by: PATHOLOGY

## 2025-04-16 ENCOUNTER — OFFICE VISIT (OUTPATIENT)
Dept: UROLOGY | Facility: MEDICAL CENTER | Age: 30
End: 2025-04-16

## 2025-04-16 VITALS
RESPIRATION RATE: 21 BRPM | BODY MASS INDEX: 25.58 KG/M2 | OXYGEN SATURATION: 100 % | SYSTOLIC BLOOD PRESSURE: 118 MMHG | HEART RATE: 90 BPM | WEIGHT: 163 LBS | DIASTOLIC BLOOD PRESSURE: 80 MMHG | HEIGHT: 67 IN

## 2025-04-16 DIAGNOSIS — N47.1 PHIMOSIS: Primary | ICD-10-CM

## 2025-04-16 PROCEDURE — 99024 POSTOP FOLLOW-UP VISIT: CPT

## 2025-04-16 NOTE — PROGRESS NOTES
Name: Maurisio Ramirez      : 1995      MRN: [id number]  Encounter Provider: MIMI Munroe  Encounter Date: 2025   [facility]  :  Assessment & Plan  Phimosis  S/p circumcision with Dr. Fox on 2025, no complications noted.  On physical exam healing very well. Sutures mostly dissolved-reassured patient that these will dissolve on their own and may take several weeks. No bleeding, redness, or signs of infection.   Discussed to call office if experiencing:  Fever, chills, swelling, redness, warmth around the wound, severe pain, yellowish, greenish, or bloody discharge, foul smell coming from the cut site, or problems passing urine  Follow up in 4 weeks          History of Present Illness   Maurisio Ramirez is a 29 y.o. male who presents for postop.  Patient underwent circumcision with Dr. Fox on 2025.  Operative findings revealed no complications.  Today in the office pt states that he is overall healing well. Denies any pain, notes minimal bleeding on underside of penis that has now cleared. Denies any LUTS and voiding well.    Review of Systems   Constitutional:  Negative for chills and fever.   Gastrointestinal:  Negative for abdominal pain.   Genitourinary:  Negative for difficulty urinating, dysuria, flank pain, frequency, hematuria and urgency.          Objective   There were no vitals taken for this visit.    Physical Exam  Vitals reviewed.   Constitutional:       General: He is not in acute distress.     Appearance: Normal appearance. He is normal weight. He is not toxic-appearing.   HENT:      Head: Normocephalic and atraumatic.   Eyes:      Conjunctiva/sclera: Conjunctivae normal.   Cardiovascular:      Rate and Rhythm: Normal rate.   Pulmonary:      Effort: Pulmonary effort is normal.   Abdominal:      Palpations: Abdomen is soft.   Genitourinary:     Comments: Penile incision site clean, dry and intact, healing well. No redness or  "signs of infection noted.   Musculoskeletal:         General: Normal range of motion.      Cervical back: Normal range of motion.   Skin:     General: Skin is warm and dry.   Neurological:      Mental Status: He is alert and oriented to person, place, and time.   Psychiatric:         Mood and Affect: Mood normal.         Behavior: Behavior normal.           Results   No results found for: \"PSA\"  Lab Results   Component Value Date    CALCIUM 9.0 10/11/2024    K 3.7 10/11/2024    CO2 27 10/11/2024     10/11/2024    BUN 10 10/11/2024    CREATININE 0.94 10/11/2024     Lab Results   Component Value Date    WBC 5.88 10/11/2024    HGB 14.6 10/11/2024    HCT 41.6 10/11/2024    MCV 85 10/11/2024     10/11/2024       Office Urine Dip  No results found for this or any previous visit (from the past hour).      "

## 2025-05-02 ENCOUNTER — TELEPHONE (OUTPATIENT)
Dept: NEUROLOGY | Facility: CLINIC | Age: 30
End: 2025-05-02

## 2025-05-02 NOTE — TELEPHONE ENCOUNTER
Tried calling PT spouse to confirm upcoming appointment on 5/5 at 8am with Dr. Givens at the [address] [place] [address] location. Left Pt VM with call back number.

## (undated) DEVICE — EXIDINE 4 PCT

## (undated) DEVICE — BETHLEHEM UNIVERSAL MINOR GEN: Brand: CARDINAL HEALTH

## (undated) DEVICE — PREMIUM DRY TRAY LF: Brand: MEDLINE INDUSTRIES, INC.

## (undated) DEVICE — PLUMEPEN PRO 10FT

## (undated) DEVICE — GLOVE SRG BIOGEL 7.5

## (undated) DEVICE — GAUZE SPONGES,16 PLY: Brand: CURITY

## (undated) DEVICE — INTENDED FOR TISSUE SEPARATION, AND OTHER PROCEDURES THAT REQUIRE A SHARP SURGICAL BLADE TO PUNCTURE OR CUT.: Brand: BARD-PARKER SAFETY BLADES SIZE 15, STERILE

## (undated) DEVICE — COBAN 1 IN UNSTERILE

## (undated) DEVICE — CAUTERY TIP POLISHER: Brand: DEVON

## (undated) DEVICE — OCCLUSIVE GAUZE STRIP,3% BISMUTH TRIBROMOPHENATE IN PETROLATUM BLEND: Brand: XEROFORM

## (undated) DEVICE — STRL COTTON TIP APPLCTR 6IN PK: Brand: CARDINAL HEALTH

## (undated) DEVICE — SUT VICRYL 4-0 PS-2 27 IN J426H

## (undated) DEVICE — SCD SEQUENTIAL COMPRESSION COMFORT SLEEVE MEDIUM KNEE LENGTH: Brand: KENDALL SCD